# Patient Record
Sex: FEMALE | Race: BLACK OR AFRICAN AMERICAN | NOT HISPANIC OR LATINO | URBAN - METROPOLITAN AREA
[De-identification: names, ages, dates, MRNs, and addresses within clinical notes are randomized per-mention and may not be internally consistent; named-entity substitution may affect disease eponyms.]

---

## 2021-11-03 ENCOUNTER — EMERGENCY (EMERGENCY)
Facility: HOSPITAL | Age: 26
LOS: 1 days | Discharge: HOME | End: 2021-11-03
Attending: EMERGENCY MEDICINE | Admitting: EMERGENCY MEDICINE
Payer: MEDICAID

## 2021-11-03 VITALS
OXYGEN SATURATION: 98 % | RESPIRATION RATE: 18 BRPM | DIASTOLIC BLOOD PRESSURE: 65 MMHG | HEART RATE: 70 BPM | TEMPERATURE: 98 F | SYSTOLIC BLOOD PRESSURE: 102 MMHG

## 2021-11-03 VITALS
WEIGHT: 117.07 LBS | HEART RATE: 105 BPM | DIASTOLIC BLOOD PRESSURE: 76 MMHG | RESPIRATION RATE: 16 BRPM | TEMPERATURE: 98 F | HEIGHT: 64 IN | OXYGEN SATURATION: 99 % | SYSTOLIC BLOOD PRESSURE: 108 MMHG

## 2021-11-03 DIAGNOSIS — R07.89 OTHER CHEST PAIN: ICD-10-CM

## 2021-11-03 DIAGNOSIS — I25.10 ATHEROSCLEROTIC HEART DISEASE OF NATIVE CORONARY ARTERY WITHOUT ANGINA PECTORIS: ICD-10-CM

## 2021-11-03 DIAGNOSIS — Z88.1 ALLERGY STATUS TO OTHER ANTIBIOTIC AGENTS STATUS: ICD-10-CM

## 2021-11-03 DIAGNOSIS — M71.21 SYNOVIAL CYST OF POPLITEAL SPACE [BAKER], RIGHT KNEE: ICD-10-CM

## 2021-11-03 DIAGNOSIS — R00.2 PALPITATIONS: ICD-10-CM

## 2021-11-03 LAB
ALBUMIN SERPL ELPH-MCNC: 4.2 G/DL — SIGNIFICANT CHANGE UP (ref 3.4–5)
ALP SERPL-CCNC: 80 U/L — SIGNIFICANT CHANGE UP (ref 40–120)
ALT FLD-CCNC: 37 U/L — SIGNIFICANT CHANGE UP (ref 12–42)
ANION GAP SERPL CALC-SCNC: 10 MMOL/L — SIGNIFICANT CHANGE UP (ref 9–16)
APTT BLD: 32.7 SEC — SIGNIFICANT CHANGE UP (ref 27.5–35.5)
AST SERPL-CCNC: 19 U/L — SIGNIFICANT CHANGE UP (ref 15–37)
BASOPHILS # BLD AUTO: 0.05 K/UL — SIGNIFICANT CHANGE UP (ref 0–0.2)
BASOPHILS NFR BLD AUTO: 0.8 % — SIGNIFICANT CHANGE UP (ref 0–2)
BILIRUB SERPL-MCNC: 0.2 MG/DL — SIGNIFICANT CHANGE UP (ref 0.2–1.2)
BUN SERPL-MCNC: 11 MG/DL — SIGNIFICANT CHANGE UP (ref 7–23)
CALCIUM SERPL-MCNC: 9.7 MG/DL — SIGNIFICANT CHANGE UP (ref 8.5–10.5)
CHLORIDE SERPL-SCNC: 103 MMOL/L — SIGNIFICANT CHANGE UP (ref 96–108)
CO2 SERPL-SCNC: 28 MMOL/L — SIGNIFICANT CHANGE UP (ref 22–31)
CREAT SERPL-MCNC: 1.02 MG/DL — SIGNIFICANT CHANGE UP (ref 0.5–1.3)
D DIMER BLD IA.RAPID-MCNC: <187 NG/ML DDU — SIGNIFICANT CHANGE UP
EOSINOPHIL # BLD AUTO: 0.45 K/UL — SIGNIFICANT CHANGE UP (ref 0–0.5)
EOSINOPHIL NFR BLD AUTO: 7.2 % — HIGH (ref 0–6)
GLUCOSE SERPL-MCNC: 92 MG/DL — SIGNIFICANT CHANGE UP (ref 70–99)
HCT VFR BLD CALC: 34.5 % — SIGNIFICANT CHANGE UP (ref 34.5–45)
HGB BLD-MCNC: 11.6 G/DL — SIGNIFICANT CHANGE UP (ref 11.5–15.5)
IMM GRANULOCYTES NFR BLD AUTO: 0.2 % — SIGNIFICANT CHANGE UP (ref 0–1.5)
INR BLD: 1.05 — SIGNIFICANT CHANGE UP (ref 0.88–1.16)
LYMPHOCYTES # BLD AUTO: 2.47 K/UL — SIGNIFICANT CHANGE UP (ref 1–3.3)
LYMPHOCYTES # BLD AUTO: 39.8 % — SIGNIFICANT CHANGE UP (ref 13–44)
MANUAL SMEAR VERIFICATION: SIGNIFICANT CHANGE UP
MCHC RBC-ENTMCNC: 24.4 PG — LOW (ref 27–34)
MCHC RBC-ENTMCNC: 33.6 GM/DL — SIGNIFICANT CHANGE UP (ref 32–36)
MCV RBC AUTO: 72.6 FL — LOW (ref 80–100)
MONOCYTES # BLD AUTO: 0.52 K/UL — SIGNIFICANT CHANGE UP (ref 0–0.9)
MONOCYTES NFR BLD AUTO: 8.4 % — SIGNIFICANT CHANGE UP (ref 2–14)
NEUTROPHILS # BLD AUTO: 2.71 K/UL — SIGNIFICANT CHANGE UP (ref 1.8–7.4)
NEUTROPHILS NFR BLD AUTO: 43.6 % — SIGNIFICANT CHANGE UP (ref 43–77)
NRBC # BLD: 0 /100 WBCS — SIGNIFICANT CHANGE UP (ref 0–0)
PLAT MORPH BLD: NORMAL — SIGNIFICANT CHANGE UP
PLATELET # BLD AUTO: 372 K/UL — SIGNIFICANT CHANGE UP (ref 150–400)
POTASSIUM SERPL-MCNC: 4 MMOL/L — SIGNIFICANT CHANGE UP (ref 3.5–5.3)
POTASSIUM SERPL-SCNC: 4 MMOL/L — SIGNIFICANT CHANGE UP (ref 3.5–5.3)
PROT SERPL-MCNC: 7.9 G/DL — SIGNIFICANT CHANGE UP (ref 6.4–8.2)
PROTHROM AB SERPL-ACNC: 12.4 SEC — SIGNIFICANT CHANGE UP (ref 10.6–13.6)
RBC # BLD: 4.75 M/UL — SIGNIFICANT CHANGE UP (ref 3.8–5.2)
RBC # FLD: 16 % — HIGH (ref 10.3–14.5)
RBC BLD AUTO: NORMAL — SIGNIFICANT CHANGE UP
SODIUM SERPL-SCNC: 141 MMOL/L — SIGNIFICANT CHANGE UP (ref 132–145)
WBC # BLD: 6.21 K/UL — SIGNIFICANT CHANGE UP (ref 3.8–10.5)
WBC # FLD AUTO: 6.21 K/UL — SIGNIFICANT CHANGE UP (ref 3.8–10.5)

## 2021-11-03 NOTE — ED ADULT NURSE REASSESSMENT NOTE - NS ED NURSE REASSESS COMMENT FT1
Pt received from day shift A&Ox3, spon resps on Ra unlabored, NAD. Pt just returned from US. Pt reports mild headache, but refused medication. Pending blood draw. Will complete and continue to monitor.

## 2021-11-03 NOTE — ED PROVIDER NOTE - PATIENT PORTAL LINK FT
You can access the FollowMyHealth Patient Portal offered by [facility] by registering at the following website: [email or web address]. By joining [employer]’s FollowMyHealth portal, you will also be able to view your health information using other applications (apps) compatible with our system.

## 2021-11-03 NOTE — ED PROVIDER NOTE - OBJECTIVE STATEMENT
25 y.o. female with c/o cest pain palpitations and R calf pain, she was evaluated at her PCP and a ddimer was (+), she was sent to the ER at [facility] and they did blood tests and sent her home. She had another ddimer at her PCP and oit was still elevated, so she came to [facility]. c/o R calf pain, chest pain resolved, no exertional sx. no FH DVT. (+) FHX CAD, sickle cell trait.

## 2021-11-03 NOTE — ED ADULT TRIAGE NOTE - CHIEF COMPLAINT QUOTE
Sent from [facility] (also works there) for CP w/ palpitations and right calf pain,  intermittent since August. Pt states +D-Dimer in office today,  sent for r/o PE & DVT. + pmhx : sickle sell. LMP

## 2021-11-03 NOTE — ED PROVIDER NOTE - CLINICAL SUMMARY MEDICAL DECISION MAKING FREE TEXT BOX
labs EKG OK.  and US (+) for bakers cyst, negative for DVT. Stable for dc home. has PCP outpatient followup.

## 2022-02-10 NOTE — ED PROVIDER NOTE - INTERPRETATION
Prior Authorization Retail Medication Request    Medication/Dose: linaclotide (LINZESS) 145 MCG capsule  ICD code (if different than what is on RX):    Previously Tried and Failed:  Has tried and failed lactulose solution and Trulance  Rationale:      Insurance Name:    Insurance ID:        Pharmacy Information (if different than what is on RX)  Name:    Phone:     abnormal

## 2024-06-27 ENCOUNTER — EMERGENCY (EMERGENCY)
Facility: HOSPITAL | Age: 29
LOS: 1 days | Discharge: HOME | End: 2024-06-27
Attending: EMERGENCY MEDICINE
Payer: COMMERCIAL

## 2024-06-27 VITALS
DIASTOLIC BLOOD PRESSURE: 75 MMHG | SYSTOLIC BLOOD PRESSURE: 120 MMHG | HEIGHT: 64 IN | RESPIRATION RATE: 18 BRPM | OXYGEN SATURATION: 96 % | HEART RATE: 86 BPM | WEIGHT: 124.12 LBS | TEMPERATURE: 99 F

## 2024-06-27 LAB
ALBUMIN SERPL ELPH-MCNC: 4.7 G/DL — SIGNIFICANT CHANGE UP (ref 3.3–5.2)
ALP SERPL-CCNC: 72 U/L — SIGNIFICANT CHANGE UP (ref 40–120)
ALT FLD-CCNC: 29 U/L — SIGNIFICANT CHANGE UP
ANION GAP SERPL CALC-SCNC: 14 MMOL/L — SIGNIFICANT CHANGE UP (ref 5–17)
ANISOCYTOSIS BLD QL: SLIGHT — SIGNIFICANT CHANGE UP
APPEARANCE UR: CLEAR — SIGNIFICANT CHANGE UP
AST SERPL-CCNC: 26 U/L — SIGNIFICANT CHANGE UP
BASOPHILS # BLD AUTO: 0.05 K/UL — SIGNIFICANT CHANGE UP (ref 0–0.2)
BASOPHILS NFR BLD AUTO: 0.5 % — SIGNIFICANT CHANGE UP (ref 0–2)
BILIRUB SERPL-MCNC: <0.2 MG/DL — LOW (ref 0.4–2)
BILIRUB UR-MCNC: NEGATIVE — SIGNIFICANT CHANGE UP
BLD GP AB SCN SERPL QL: SIGNIFICANT CHANGE UP
BUN SERPL-MCNC: 10.3 MG/DL — SIGNIFICANT CHANGE UP (ref 8–20)
CALCIUM SERPL-MCNC: 10 MG/DL — SIGNIFICANT CHANGE UP (ref 8.4–10.5)
CHLORIDE SERPL-SCNC: 99 MMOL/L — SIGNIFICANT CHANGE UP (ref 96–108)
CO2 SERPL-SCNC: 21 MMOL/L — LOW (ref 22–29)
COLOR SPEC: YELLOW — SIGNIFICANT CHANGE UP
CREAT SERPL-MCNC: 0.72 MG/DL — SIGNIFICANT CHANGE UP (ref 0.5–1.3)
DIFF PNL FLD: NEGATIVE — SIGNIFICANT CHANGE UP
EGFR: 118 ML/MIN/1.73M2 — SIGNIFICANT CHANGE UP
EOSINOPHIL # BLD AUTO: 0.39 K/UL — SIGNIFICANT CHANGE UP (ref 0–0.5)
EOSINOPHIL NFR BLD AUTO: 4.1 % — SIGNIFICANT CHANGE UP (ref 0–6)
GLUCOSE SERPL-MCNC: 84 MG/DL — SIGNIFICANT CHANGE UP (ref 70–99)
GLUCOSE UR QL: NEGATIVE MG/DL — SIGNIFICANT CHANGE UP
HCG SERPL-ACNC: HIGH MIU/ML
HCT VFR BLD CALC: 37.4 % — SIGNIFICANT CHANGE UP (ref 34.5–45)
HGB BLD-MCNC: 12.9 G/DL — SIGNIFICANT CHANGE UP (ref 11.5–15.5)
HIV 1 & 2 AB SERPL IA.RAPID: SIGNIFICANT CHANGE UP
IMM GRANULOCYTES NFR BLD AUTO: 0.3 % — SIGNIFICANT CHANGE UP (ref 0–0.9)
KETONES UR-MCNC: NEGATIVE MG/DL — SIGNIFICANT CHANGE UP
LEUKOCYTE ESTERASE UR-ACNC: NEGATIVE — SIGNIFICANT CHANGE UP
LYMPHOCYTES # BLD AUTO: 2.38 K/UL — SIGNIFICANT CHANGE UP (ref 1–3.3)
LYMPHOCYTES # BLD AUTO: 24.7 % — SIGNIFICANT CHANGE UP (ref 13–44)
MANUAL SMEAR VERIFICATION: SIGNIFICANT CHANGE UP
MCHC RBC-ENTMCNC: 25.1 PG — LOW (ref 27–34)
MCHC RBC-ENTMCNC: 34.5 GM/DL — SIGNIFICANT CHANGE UP (ref 32–36)
MCV RBC AUTO: 72.8 FL — LOW (ref 80–100)
MONOCYTES # BLD AUTO: 0.8 K/UL — SIGNIFICANT CHANGE UP (ref 0–0.9)
MONOCYTES NFR BLD AUTO: 8.3 % — SIGNIFICANT CHANGE UP (ref 2–14)
NEUTROPHILS # BLD AUTO: 5.97 K/UL — SIGNIFICANT CHANGE UP (ref 1.8–7.4)
NEUTROPHILS NFR BLD AUTO: 62.1 % — SIGNIFICANT CHANGE UP (ref 43–77)
NITRITE UR-MCNC: NEGATIVE — SIGNIFICANT CHANGE UP
PH UR: 6.5 — SIGNIFICANT CHANGE UP (ref 5–8)
PLAT MORPH BLD: NORMAL — SIGNIFICANT CHANGE UP
PLATELET # BLD AUTO: 361 K/UL — SIGNIFICANT CHANGE UP (ref 150–400)
POLYCHROMASIA BLD QL SMEAR: SLIGHT — SIGNIFICANT CHANGE UP
POTASSIUM SERPL-MCNC: 4.1 MMOL/L — SIGNIFICANT CHANGE UP (ref 3.5–5.3)
POTASSIUM SERPL-SCNC: 4.1 MMOL/L — SIGNIFICANT CHANGE UP (ref 3.5–5.3)
PROT SERPL-MCNC: 7.7 G/DL — SIGNIFICANT CHANGE UP (ref 6.6–8.7)
PROT UR-MCNC: NEGATIVE MG/DL — SIGNIFICANT CHANGE UP
RBC # BLD: 5.14 M/UL — SIGNIFICANT CHANGE UP (ref 3.8–5.2)
RBC # FLD: 16.1 % — HIGH (ref 10.3–14.5)
RBC BLD AUTO: ABNORMAL
SODIUM SERPL-SCNC: 134 MMOL/L — LOW (ref 135–145)
SP GR SPEC: 1.01 — SIGNIFICANT CHANGE UP (ref 1–1.03)
UROBILINOGEN FLD QL: 0.2 MG/DL — SIGNIFICANT CHANGE UP (ref 0.2–1)
WBC # BLD: 9.62 K/UL — SIGNIFICANT CHANGE UP (ref 3.8–10.5)
WBC # FLD AUTO: 9.62 K/UL — SIGNIFICANT CHANGE UP (ref 3.8–10.5)

## 2024-06-27 RX ORDER — SODIUM CHLORIDE 0.9 % (FLUSH) 0.9 %
1000 SYRINGE (ML) INJECTION ONCE
Refills: 0 | Status: COMPLETED | OUTPATIENT
Start: 2024-06-27 | End: 2024-06-27

## 2024-06-27 RX ADMIN — Medication 1000 MILLILITER(S): at 18:14

## 2024-06-28 LAB
C TRACH RRNA SPEC QL NAA+PROBE: SIGNIFICANT CHANGE UP
HCV AB S/CO SERPL IA: 0.11 S/CO — SIGNIFICANT CHANGE UP (ref 0–0.99)
HCV AB SERPL-IMP: SIGNIFICANT CHANGE UP
N GONORRHOEA RRNA SPEC QL NAA+PROBE: SIGNIFICANT CHANGE UP
SPECIMEN SOURCE: SIGNIFICANT CHANGE UP

## 2024-06-29 LAB
CULTURE RESULTS: SIGNIFICANT CHANGE UP
SPECIMEN SOURCE: SIGNIFICANT CHANGE UP

## 2024-07-17 PROBLEM — Z78.9 OTHER SPECIFIED HEALTH STATUS: Chronic | Status: ACTIVE | Noted: 2021-11-03

## 2024-08-08 ENCOUNTER — EMERGENCY (EMERGENCY)
Facility: HOSPITAL | Age: 29
LOS: 1 days | Discharge: HOME | End: 2024-08-08
Attending: EMERGENCY MEDICINE
Payer: COMMERCIAL

## 2024-08-08 VITALS
WEIGHT: 108.69 LBS | HEIGHT: 64 IN | OXYGEN SATURATION: 98 % | TEMPERATURE: 99 F | HEART RATE: 102 BPM | SYSTOLIC BLOOD PRESSURE: 114 MMHG | RESPIRATION RATE: 18 BRPM | DIASTOLIC BLOOD PRESSURE: 86 MMHG

## 2024-08-08 LAB
ALBUMIN SERPL ELPH-MCNC: 4.1 G/DL — SIGNIFICANT CHANGE UP (ref 3.3–5.2)
ALP SERPL-CCNC: 46 U/L — SIGNIFICANT CHANGE UP (ref 40–120)
ALT FLD-CCNC: 8 U/L — SIGNIFICANT CHANGE UP
ANION GAP SERPL CALC-SCNC: 18 MMOL/L — HIGH (ref 5–17)
ANISOCYTOSIS BLD QL: SLIGHT — SIGNIFICANT CHANGE UP
APPEARANCE UR: ABNORMAL
AST SERPL-CCNC: 15 U/L — SIGNIFICANT CHANGE UP
BACTERIA # UR AUTO: ABNORMAL /HPF
BASOPHILS # BLD AUTO: 0.04 K/UL — SIGNIFICANT CHANGE UP (ref 0–0.2)
BASOPHILS NFR BLD AUTO: 0.4 % — SIGNIFICANT CHANGE UP (ref 0–2)
BILIRUB SERPL-MCNC: 0.4 MG/DL — SIGNIFICANT CHANGE UP (ref 0.4–2)
BILIRUB UR-MCNC: NEGATIVE — SIGNIFICANT CHANGE UP
BUN SERPL-MCNC: 5.7 MG/DL — LOW (ref 8–20)
CALCIUM SERPL-MCNC: 9.8 MG/DL — SIGNIFICANT CHANGE UP (ref 8.4–10.5)
CAST: 2 /LPF — SIGNIFICANT CHANGE UP (ref 0–4)
CHLORIDE SERPL-SCNC: 97 MMOL/L — SIGNIFICANT CHANGE UP (ref 96–108)
CO2 SERPL-SCNC: 19 MMOL/L — LOW (ref 22–29)
COLOR SPEC: YELLOW — SIGNIFICANT CHANGE UP
CREAT SERPL-MCNC: 0.5 MG/DL — SIGNIFICANT CHANGE UP (ref 0.5–1.3)
DIFF PNL FLD: NEGATIVE — SIGNIFICANT CHANGE UP
EGFR: 133 ML/MIN/1.73M2 — SIGNIFICANT CHANGE UP
EOSINOPHIL # BLD AUTO: 0.09 K/UL — SIGNIFICANT CHANGE UP (ref 0–0.5)
EOSINOPHIL NFR BLD AUTO: 0.8 % — SIGNIFICANT CHANGE UP (ref 0–6)
GLUCOSE SERPL-MCNC: 69 MG/DL — LOW (ref 70–99)
GLUCOSE UR QL: NEGATIVE MG/DL — SIGNIFICANT CHANGE UP
HCT VFR BLD CALC: 36.7 % — SIGNIFICANT CHANGE UP (ref 34.5–45)
HGB BLD-MCNC: 12.8 G/DL — SIGNIFICANT CHANGE UP (ref 11.5–15.5)
IMM GRANULOCYTES NFR BLD AUTO: 0.5 % — SIGNIFICANT CHANGE UP (ref 0–0.9)
KETONES UR-MCNC: >=160 MG/DL
LEUKOCYTE ESTERASE UR-ACNC: NEGATIVE — SIGNIFICANT CHANGE UP
LYMPHOCYTES # BLD AUTO: 1.26 K/UL — SIGNIFICANT CHANGE UP (ref 1–3.3)
LYMPHOCYTES # BLD AUTO: 11.7 % — LOW (ref 13–44)
MACROCYTES BLD QL: SLIGHT — SIGNIFICANT CHANGE UP
MAGNESIUM SERPL-MCNC: 1.9 MG/DL — SIGNIFICANT CHANGE UP (ref 1.6–2.6)
MANUAL SMEAR VERIFICATION: SIGNIFICANT CHANGE UP
MCHC RBC-ENTMCNC: 25.9 PG — LOW (ref 27–34)
MCHC RBC-ENTMCNC: 34.9 GM/DL — SIGNIFICANT CHANGE UP (ref 32–36)
MCV RBC AUTO: 74.1 FL — LOW (ref 80–100)
MICROCYTES BLD QL: SLIGHT — SIGNIFICANT CHANGE UP
MONOCYTES # BLD AUTO: 0.75 K/UL — SIGNIFICANT CHANGE UP (ref 0–0.9)
MONOCYTES NFR BLD AUTO: 7 % — SIGNIFICANT CHANGE UP (ref 2–14)
NEUTROPHILS # BLD AUTO: 8.57 K/UL — HIGH (ref 1.8–7.4)
NEUTROPHILS NFR BLD AUTO: 79.6 % — HIGH (ref 43–77)
NITRITE UR-MCNC: NEGATIVE — SIGNIFICANT CHANGE UP
PH UR: 6 — SIGNIFICANT CHANGE UP (ref 5–8)
PHOSPHATE SERPL-MCNC: 3.6 MG/DL — SIGNIFICANT CHANGE UP (ref 2.4–4.7)
PLAT MORPH BLD: NORMAL — SIGNIFICANT CHANGE UP
PLATELET # BLD AUTO: 305 K/UL — SIGNIFICANT CHANGE UP (ref 150–400)
POLYCHROMASIA BLD QL SMEAR: SLIGHT — SIGNIFICANT CHANGE UP
POTASSIUM SERPL-MCNC: 3.7 MMOL/L — SIGNIFICANT CHANGE UP (ref 3.5–5.3)
POTASSIUM SERPL-SCNC: 3.7 MMOL/L — SIGNIFICANT CHANGE UP (ref 3.5–5.3)
PROT SERPL-MCNC: 8 G/DL — SIGNIFICANT CHANGE UP (ref 6.6–8.7)
PROT UR-MCNC: 30 MG/DL
RBC # BLD: 4.95 M/UL — SIGNIFICANT CHANGE UP (ref 3.8–5.2)
RBC # FLD: 14.9 % — HIGH (ref 10.3–14.5)
RBC BLD AUTO: ABNORMAL
RBC CASTS # UR COMP ASSIST: 2 /HPF — SIGNIFICANT CHANGE UP (ref 0–4)
SODIUM SERPL-SCNC: 134 MMOL/L — LOW (ref 135–145)
SP GR SPEC: 1.02 — SIGNIFICANT CHANGE UP (ref 1–1.03)
SQUAMOUS # UR AUTO: 25 /HPF — HIGH (ref 0–5)
UROBILINOGEN FLD QL: 1 MG/DL — SIGNIFICANT CHANGE UP (ref 0.2–1)
WBC # BLD: 10.76 K/UL — HIGH (ref 3.8–10.5)
WBC # FLD AUTO: 10.76 K/UL — HIGH (ref 3.8–10.5)
WBC UR QL: 10 /HPF — HIGH (ref 0–5)

## 2024-08-08 RX ORDER — BACTERIOSTATIC SODIUM CHLORIDE 0.9 %
2000 VIAL (ML) INJECTION ONCE
Refills: 0 | Status: COMPLETED | OUTPATIENT
Start: 2024-08-08 | End: 2024-08-08

## 2024-08-08 RX ORDER — METOCLOPRAMIDE HCL 5 MG/ML
10 VIAL (ML) INJECTION ONCE
Refills: 0 | Status: COMPLETED | OUTPATIENT
Start: 2024-08-08 | End: 2024-08-08

## 2024-08-08 RX ADMIN — Medication 10 MILLIGRAM(S): at 14:20

## 2024-08-08 RX ADMIN — Medication 2000 MILLILITER(S): at 14:19

## 2024-08-08 NOTE — ED PROVIDER NOTE - ATTENDING APP SHARED VISIT CONTRIBUTION OF CARE
I, Sary Alcantara, performed a face to face bedside interview with this patient regarding history of present illness, review of symptoms and relevant past medical, social and family history.  I completed an independent physical examination. Medical decision making, follow-up on ordered tests (ie labs, radiologic studies) and re-evaluation of the patient's status has been communicated to the ACP.  Disposition of the patient will be based on test outcome and response to ED interventions.

## 2024-08-08 NOTE — ED PROVIDER NOTE - PROGRESS NOTE DETAILS
reviewed lab work pt tolerating po and reports improvement of symptoms, supportive care and hydration   pt to follow up with pmd outpatient

## 2024-08-08 NOTE — ED PROVIDER NOTE - PATIENT PORTAL LINK FT
You can access the FollowMyHealth Patient Portal offered by [facility] by registering at the following website: [email or web address]. By joining [employer]’s FollowMyHealth portal, you will also be able to view your health information using other applications (apps) compatible with our system.

## 2024-08-08 NOTE — ED PROVIDER NOTE - CLINICAL SUMMARY MEDICAL DECISION MAKING FREE TEXT BOX
patient presenting with nausea vomiting and inability tolerate p.o., currently 11 weeks pregnant, will give IV fluids, check labs, EKG, reassess. patient presenting with nausea vomiting and inability tolerate p.o., currently 11 weeks pregnant, will give IV fluids, check labs, EKG, reassess. - reviewed lab work pt reports improvement of symptoms supportive care and hydration

## 2024-08-08 NOTE — ED PROVIDER NOTE - OBJECTIVE STATEMENT
28-year-old female with no past medical history, G1, P0 at approximately 11 weeks gestation presenting with intractable nausea vomiting, associated with palpitations, dizziness and weakness.  Patient's OB/GYN is in [place].  She states that she has been unable to tolerate anything by mouth.  Tried diplegia's, Zofran without any relief.  Denies any diarrhea, endorsing abdominal cramping, no vaginal bleeding.  She has had an ultrasound during this pregnancy which demonstrated an intrauterine pregnancy. 28-year-old female with no past medical history, G1, P0 at approximately 11 weeks gestation presenting with intractable nausea vomiting, associated with palpitations, dizziness and weakness.  Patient's OB/GYN is in [place].  She states that she has been unable to tolerate anything by mouth.  Tried Diclegis, Zofran without any relief.  Denies any diarrhea, endorsing abdominal cramping, no vaginal bleeding.  She has had an ultrasound during this pregnancy which demonstrated an intrauterine pregnancy.

## 2024-08-09 ENCOUNTER — EMERGENCY (EMERGENCY)
Facility: HOSPITAL | Age: 29
LOS: 1 days | Discharge: HOME | End: 2024-08-09
Attending: STUDENT IN AN ORGANIZED HEALTH CARE EDUCATION/TRAINING PROGRAM
Payer: COMMERCIAL

## 2024-08-09 VITALS
WEIGHT: 119.93 LBS | OXYGEN SATURATION: 100 % | TEMPERATURE: 98 F | HEART RATE: 84 BPM | RESPIRATION RATE: 18 BRPM | DIASTOLIC BLOOD PRESSURE: 65 MMHG | HEIGHT: 64 IN | SYSTOLIC BLOOD PRESSURE: 110 MMHG

## 2024-08-09 VITALS
DIASTOLIC BLOOD PRESSURE: 70 MMHG | TEMPERATURE: 98 F | SYSTOLIC BLOOD PRESSURE: 112 MMHG | HEART RATE: 80 BPM | RESPIRATION RATE: 18 BRPM | OXYGEN SATURATION: 99 %

## 2024-08-09 LAB
ALBUMIN SERPL ELPH-MCNC: 4.3 G/DL — SIGNIFICANT CHANGE UP (ref 3.3–5.2)
ALP SERPL-CCNC: 44 U/L — SIGNIFICANT CHANGE UP (ref 40–120)
ALT FLD-CCNC: 16 U/L — SIGNIFICANT CHANGE UP
ANION GAP SERPL CALC-SCNC: 25 MMOL/L — HIGH (ref 5–17)
AST SERPL-CCNC: 28 U/L — SIGNIFICANT CHANGE UP
BASOPHILS # BLD AUTO: 0.02 K/UL — SIGNIFICANT CHANGE UP (ref 0–0.2)
BASOPHILS NFR BLD AUTO: 0.2 % — SIGNIFICANT CHANGE UP (ref 0–2)
BILIRUB SERPL-MCNC: 0.6 MG/DL — SIGNIFICANT CHANGE UP (ref 0.4–2)
BLD GP AB SCN SERPL QL: SIGNIFICANT CHANGE UP
BUN SERPL-MCNC: 4.1 MG/DL — LOW (ref 8–20)
CALCIUM SERPL-MCNC: 10 MG/DL — SIGNIFICANT CHANGE UP (ref 8.4–10.5)
CHLORIDE SERPL-SCNC: 96 MMOL/L — SIGNIFICANT CHANGE UP (ref 96–108)
CO2 SERPL-SCNC: 12 MMOL/L — LOW (ref 22–29)
CREAT SERPL-MCNC: 0.48 MG/DL — LOW (ref 0.5–1.3)
CULTURE RESULTS: SIGNIFICANT CHANGE UP
EGFR: 134 ML/MIN/1.73M2 — SIGNIFICANT CHANGE UP
EOSINOPHIL # BLD AUTO: 0.01 K/UL — SIGNIFICANT CHANGE UP (ref 0–0.5)
EOSINOPHIL NFR BLD AUTO: 0.1 % — SIGNIFICANT CHANGE UP (ref 0–6)
GAS PNL BLDV: SIGNIFICANT CHANGE UP
GLUCOSE SERPL-MCNC: 92 MG/DL — SIGNIFICANT CHANGE UP (ref 70–99)
HCG SERPL-ACNC: HIGH MIU/ML
HCT VFR BLD CALC: 34.7 % — SIGNIFICANT CHANGE UP (ref 34.5–45)
HGB BLD-MCNC: 12.3 G/DL — SIGNIFICANT CHANGE UP (ref 11.5–15.5)
IMM GRANULOCYTES NFR BLD AUTO: 0.5 % — SIGNIFICANT CHANGE UP (ref 0–0.9)
LIDOCAIN IGE QN: 28 U/L — SIGNIFICANT CHANGE UP (ref 22–51)
LYMPHOCYTES # BLD AUTO: 1.02 K/UL — SIGNIFICANT CHANGE UP (ref 1–3.3)
LYMPHOCYTES # BLD AUTO: 8.5 % — LOW (ref 13–44)
MCHC RBC-ENTMCNC: 25.8 PG — LOW (ref 27–34)
MCHC RBC-ENTMCNC: 35.4 GM/DL — SIGNIFICANT CHANGE UP (ref 32–36)
MCV RBC AUTO: 72.9 FL — LOW (ref 80–100)
MONOCYTES # BLD AUTO: 0.46 K/UL — SIGNIFICANT CHANGE UP (ref 0–0.9)
MONOCYTES NFR BLD AUTO: 3.8 % — SIGNIFICANT CHANGE UP (ref 2–14)
NEUTROPHILS # BLD AUTO: 10.49 K/UL — HIGH (ref 1.8–7.4)
NEUTROPHILS NFR BLD AUTO: 86.9 % — HIGH (ref 43–77)
PLATELET # BLD AUTO: 247 K/UL — SIGNIFICANT CHANGE UP (ref 150–400)
POTASSIUM SERPL-MCNC: 3.7 MMOL/L — SIGNIFICANT CHANGE UP (ref 3.5–5.3)
POTASSIUM SERPL-SCNC: 3.7 MMOL/L — SIGNIFICANT CHANGE UP (ref 3.5–5.3)
PROT SERPL-MCNC: 8.1 G/DL — SIGNIFICANT CHANGE UP (ref 6.6–8.7)
RBC # BLD: 4.76 M/UL — SIGNIFICANT CHANGE UP (ref 3.8–5.2)
RBC # FLD: 14.9 % — HIGH (ref 10.3–14.5)
SODIUM SERPL-SCNC: 133 MMOL/L — LOW (ref 135–145)
SPECIMEN SOURCE: SIGNIFICANT CHANGE UP
WBC # BLD: 12.06 K/UL — HIGH (ref 3.8–10.5)
WBC # FLD AUTO: 12.06 K/UL — HIGH (ref 3.8–10.5)

## 2024-08-09 RX ORDER — BACTERIOSTATIC SODIUM CHLORIDE 0.9 %
1000 VIAL (ML) INJECTION ONCE
Refills: 0 | Status: COMPLETED | OUTPATIENT
Start: 2024-08-09 | End: 2024-08-09

## 2024-08-09 RX ORDER — PANTOPRAZOLE SODIUM 20 MG/1
20 TABLET, DELAYED RELEASE ORAL ONCE
Refills: 0 | Status: COMPLETED | OUTPATIENT
Start: 2024-08-09 | End: 2024-08-09

## 2024-08-09 RX ORDER — DIPHENHYDRAMINE HCL 25 MG
25 CAPSULE ORAL ONCE
Refills: 0 | Status: COMPLETED | OUTPATIENT
Start: 2024-08-09 | End: 2024-08-09

## 2024-08-09 RX ORDER — ACETAMINOPHEN 500 MG
1000 TABLET ORAL ONCE
Refills: 0 | Status: COMPLETED | OUTPATIENT
Start: 2024-08-09 | End: 2024-08-09

## 2024-08-09 RX ORDER — MAGNESIUM, ALUMINUM HYDROXIDE 200-225/5
30 SUSPENSION, ORAL (FINAL DOSE FORM) ORAL ONCE
Refills: 0 | Status: COMPLETED | OUTPATIENT
Start: 2024-08-09 | End: 2024-08-09

## 2024-08-09 RX ORDER — DEXTROSE MONOHYDRATE, SODIUM CHLORIDE, SODIUM LACTATE, CALCIUM CHLORIDE, MAGNESIUM CHLORIDE 1.5; 538; 448; 18.4; 5.08 G/100ML; MG/100ML; MG/100ML; MG/100ML; MG/100ML
1000 SOLUTION INTRAPERITONEAL
Refills: 0 | Status: ACTIVE | OUTPATIENT
Start: 2024-08-09 | End: 2025-07-08

## 2024-08-09 RX ORDER — METOCLOPRAMIDE HCL 5 MG/ML
10 VIAL (ML) INJECTION ONCE
Refills: 0 | Status: COMPLETED | OUTPATIENT
Start: 2024-08-09 | End: 2024-08-09

## 2024-08-09 RX ORDER — DEXTROSE MONOHYDRATE, SODIUM CHLORIDE, SODIUM LACTATE, CALCIUM CHLORIDE, MAGNESIUM CHLORIDE 1.5; 538; 448; 18.4; 5.08 G/100ML; MG/100ML; MG/100ML; MG/100ML; MG/100ML
1000 SOLUTION INTRAPERITONEAL ONCE
Refills: 0 | Status: COMPLETED | OUTPATIENT
Start: 2024-08-09 | End: 2024-08-09

## 2024-08-09 RX ORDER — ONDANSETRON HCL/PF 4 MG/2 ML
4 VIAL (ML) INJECTION ONCE
Refills: 0 | Status: COMPLETED | OUTPATIENT
Start: 2024-08-09 | End: 2024-08-09

## 2024-08-09 RX ORDER — FAMOTIDINE 40 MG/1
20 TABLET, FILM COATED ORAL
Refills: 0 | Status: ACTIVE | OUTPATIENT
Start: 2024-08-09 | End: 2025-07-08

## 2024-08-09 RX ORDER — FAMOTIDINE 40 MG/1
20 TABLET, FILM COATED ORAL ONCE
Refills: 0 | Status: COMPLETED | OUTPATIENT
Start: 2024-08-09 | End: 2024-08-09

## 2024-08-09 RX ORDER — METOCLOPRAMIDE HCL 5 MG/ML
10 VIAL (ML) INJECTION EVERY 6 HOURS
Refills: 0 | Status: ACTIVE | OUTPATIENT
Start: 2024-08-09 | End: 2025-07-08

## 2024-08-09 RX ORDER — DIPHENHYDRAMINE HCL 25 MG
25 CAPSULE ORAL EVERY 6 HOURS
Refills: 0 | Status: ACTIVE | OUTPATIENT
Start: 2024-08-09 | End: 2025-07-08

## 2024-08-09 RX ORDER — ACETAMINOPHEN 500 MG
975 TABLET ORAL EVERY 6 HOURS
Refills: 0 | Status: DISCONTINUED | OUTPATIENT
Start: 2024-08-09 | End: 2024-08-09

## 2024-08-09 RX ADMIN — PANTOPRAZOLE SODIUM 20 MILLIGRAM(S): 20 TABLET, DELAYED RELEASE ORAL at 17:05

## 2024-08-09 RX ADMIN — Medication 10 MILLIGRAM(S): at 15:10

## 2024-08-09 RX ADMIN — Medication 4 MILLIGRAM(S): at 17:06

## 2024-08-09 RX ADMIN — Medication 10 MILLIGRAM(S): at 17:05

## 2024-08-09 RX ADMIN — Medication 4 MILLIGRAM(S): at 17:55

## 2024-08-09 RX ADMIN — FAMOTIDINE 20 MILLIGRAM(S): 40 TABLET, FILM COATED ORAL at 15:45

## 2024-08-09 RX ADMIN — Medication 1000 MILLIGRAM(S): at 15:33

## 2024-08-09 RX ADMIN — Medication 10 MILLIGRAM(S): at 17:06

## 2024-08-09 RX ADMIN — Medication 1000 MILLILITER(S): at 15:45

## 2024-08-09 RX ADMIN — Medication 4 MILLIGRAM(S): at 20:46

## 2024-08-09 RX ADMIN — DEXTROSE MONOHYDRATE, SODIUM CHLORIDE, SODIUM LACTATE, CALCIUM CHLORIDE, MAGNESIUM CHLORIDE 125 MILLILITER(S): 1.5; 538; 448; 18.4; 5.08 SOLUTION INTRAPERITONEAL at 23:04

## 2024-08-09 RX ADMIN — DEXTROSE MONOHYDRATE, SODIUM CHLORIDE, SODIUM LACTATE, CALCIUM CHLORIDE, MAGNESIUM CHLORIDE 100 MILLILITER(S): 1.5; 538; 448; 18.4; 5.08 SOLUTION INTRAPERITONEAL at 15:52

## 2024-08-09 RX ADMIN — Medication 30 MILLILITER(S): at 18:52

## 2024-08-09 RX ADMIN — DEXTROSE MONOHYDRATE, SODIUM CHLORIDE, SODIUM LACTATE, CALCIUM CHLORIDE, MAGNESIUM CHLORIDE 1000 MILLILITER(S): 1.5; 538; 448; 18.4; 5.08 SOLUTION INTRAPERITONEAL at 23:03

## 2024-08-09 RX ADMIN — Medication 1000 MILLILITER(S): at 15:10

## 2024-08-09 RX ADMIN — Medication 400 MILLIGRAM(S): at 15:10

## 2024-08-09 NOTE — ED ADULT NURSE NOTE - CHIEF COMPLAINT QUOTE
Pt BIBA c/o vomiting, abdominal pain.  Pt is approx 11 weeks pregnant.  Pt was treated yesterday for similar symptoms and told to return if symptoms worsened.
detailed exam

## 2024-08-09 NOTE — ED PROVIDER NOTE - PHYSICAL EXAMINATION
General: Awake, alert, lying in bed in NAD  HEENT: Normocephalic, atraumatic. No scleral icterus or conjunctival injection. EOMI. Moist mucous membranes. Oropharynx clear.   Neck:. Soft and supple.  Cardiac: RRR, Peripheral pulses 2+ and symmetric. No LE edema.  Resp: Lungs CTAB. No accessory muscle use  Abd: TTP LUQ. Otherwise, soft, non-tender, non-distended. No guarding, rebound, or rigidity.  Back: Spine midline and non-tender.   Skin: No rashes, abrasions, or lacerations.  Neuro: AO x 4. Moves all extremities symmetrically. Motor strength and sensation grossly intact.  Psych: Appropriate mood and affect

## 2024-08-09 NOTE — CONSULT NOTE ADULT - ATTENDING COMMENTS
28y  at 11w2d GA by LMP 24 and confirmed with 1TM US who presents to the ER for nausea, vomiting, and epigastric pain with known Hyperemesis Gravidarum, suggest IV fluids and standing Reglan as patient reports palpitations with Zofran. Return precautions discussed.

## 2024-08-09 NOTE — ED PROVIDER NOTE - NSFOLLOWUPINSTRUCTIONS_ED_ALL_ED_FT
- Please follow-up with your primary care doctor in the next 1-2 days.  Please call tomorrow for an appointment.  If you cannot follow-up with your primary care doctor please return to the ED for any urgent issues.  - You were given a copy of the tests performed today.  Please bring the results with you and review them with your primary care doctor.  - If you have any worsening of symptoms or any other concerns please return to the ED immediately.  - Please continue taking your home medications as directed.     Nausea / Vomiting    Nausea is the feeling that you have to vomit. As nausea gets worse, it can lead to vomiting. Vomiting puts you at an increased risk for dehydration. Older adults and people with other diseases or a weak immune system are at higher risk for dehydration. Drink clear fluids in small but frequent amounts as tolerated. Eat bland, easy-to-digest foods in small amounts as tolerated.    SEEK IMMEDIATE MEDICAL CARE IF YOU HAVE ANY OF THE FOLLOWING SYMPTOMS: fever, inability to keep sufficient fluids down, black or bloody vomitus, black or bloody stools, lightheadedness/dizziness, chest pain, severe headache, rash, shortness of breath, cold or clammy skin, confusion, pain with urination, or any signs of dehydration.

## 2024-08-09 NOTE — ED ADULT NURSE REASSESSMENT NOTE - NS ED NURSE REASSESS COMMENT FT1
Pt is resting in stretcher comfortably at this time. NAD. VSS. Respirations even and unlabored. Pt safety maintained. Pt denies any complaints at this time. Plan of care on going.

## 2024-08-09 NOTE — CONSULT NOTE ADULT - ASSESSMENT
27 yo  at 11w GA who presents to the ED for nausea, vomiting, and epigastric pain in the setting of known Hyperemesis Gravidarum. OBGYN consulted.   -Afebrile, VSS  -Electrolytes wnl  -Anion gap present, ketones in urine  -Pending lactate lab  -Pt had not recieved IVF or anti-nausea meds in the ER prior to GYN consulting the patient bedside as the pt's IV had infiltrated  -Recommend IVF LR bolus then maintenance 125ml/hr, IV reglan, pyridoxine doxylamine diphenhydramine pepcid, phenergan standing   -Pending lactate labs    Dispo: continue to observe, see how patient improves with IVF and medications as per above    D/w attending Dr. Jackson 29 yo  at 11w GA who presents to the ED for nausea, vomiting, and epigastric pain in the setting of known Hyperemesis Gravidarum. OBGYN consulted.   -Afebrile, VSS  -Electrolytes wnl  -Anion gap present, ketones in urine, blood glucose wnl  -Pt had not recieved IVF or anti-nausea meds in the ER prior to GYN consulting the patient bedside as the pt's IV had infiltrated  -Recommend IVF LR bolus then maintenance 125ml/hr, IV reglan, pyridoxine doxylamine diphenhydramine pepcid, phenergan standing   -Pending lactate labs    Dispo: continue to observe, see how patient improves with IVF and medications as per above    D/w attending Dr. Jackson 29 yo  at 11w GA who presents to the ED for nausea, vomiting, and epigastric pain in the setting of known Hyperemesis Gravidarum. OBGYN consulted.   -Afebrile, VSS  -Electrolytes wnl  -Anion gap present, ketones in urine, blood glucose wnl  -Pt had not recieved IVF or anti-nausea meds in the ER prior to GYN consulting the patient bedside as the pt's IV had infiltrated  -Recommend IVF LR bolus then maintenance 125ml/hr, IV reglan, pyridoxine doxylamine diphenhydramine pepcid, phenergan standing   -Pending lactate labs    Dispo: continue to observe, see how patient improves with IVF and medications as per above      ---------0122  Pt reevaluated bedside s/p zofran and reglan  reports improvement in symptoms, will try to tolerate PO. Is comfortable with going home with PO meds to pharmacy.  No need for acute inpatient admission or intervention at this time given response to medical therapy.  Stable for outpatient followup    D/w attending Dr. Jackson

## 2024-08-09 NOTE — CONSULT NOTE ADULT - SUBJECTIVE AND OBJECTIVE BOX
Name: DOMINIC MABRY  MRN: [id number]    DOMINIC MABRY is a 28y  at 11w GA who presents to the ER for nausea, vomiting, and epigastric pain with known Hyperemesis Gravidarum. Patient was diagnosed with HG at 6w GA. She was seen in the ER yesterday for similar symptoms. At that time, she recieved IVF, anti-nausea meds, and PO challenge. She passed the PO challenge and was discharged home. Patient denies fevers, chills, or sweats. She denies hematemesis. She denies constipation or diarrhea. She reports tolerating a few sips of water, a few crackers daily. She reports not being able to tolerate a full meal in weeks. Patient is unsure if she has lost weight so far this pregnancy.  Pt is seen for her OBGYN care at a [facility] practice in [place]. She has been prescribed reglan, Unisom at home for her HG symptoms.   At the time of being seen by OBGYN team in the ED, the patient had not yet recieved reglan or IV fluids because her IV line infiltrated. Her IV fluids were just being started while bedside. Pt received IV reglan while bedside.     PAST OBSTETRICAL HISTORY:   PAST GYN HISTORY: Denies history of abormal paps, STDs, ovarian cysts, or uterine fibroids.  PAST MEDICAL HISTORY: denies  PAST SURGICAL HISTORY: denies  HOME MEDS: unisom, reglan  SOCIAL:  Denies tobacco or drug use. Feels safe at home.     HOSPITAL MEDS:  dextrose 5% + sodium chloride 0.9%. 1000 milliLiter(s) IV Continuous <Continuous>      ROS:  CONSTITUTIONAL: No fever, weight loss, or fatigue  BREASTS: No pain, masses, or nipple discharge  RESPIRATORY: No shortness of breath  CARDIOVASCULAR: No chest pain, palpitations, dizziness, or leg swelling  GASTROINTESTINAL: Epigastric pain per HPI. Nausea, vomiting per HPI. No diarrhea or constipation.   GENITOURINARY: No dysuria or incontinence  SKIN: No itching, burning, rashes, or lesions   ENDOCRINE: No heat or cold intolerance; No hair loss  PSYCHIATRIC: No depression, anxiety, mood swings, or difficulty sleeping  HEME/LYMPH: No easy bruising, or bleeding gums    Vital Signs Last 24 Hrs  T(C): 36.7 (09 Aug 2024 17:00), Max: 36.9 (09 Aug 2024 13:45)  T(F): 98 (09 Aug 2024 17:00), Max: 98.4 (09 Aug 2024 13:45)  HR: 78 (09 Aug 2024 17:00) (78 - 84)  BP: 116/72 (09 Aug 2024 17:00) (110/65 - 116/72)  RR: 18 (09 Aug 2024 17:00) (18 - 18)  SpO2: 99% (09 Aug 2024 17:00) (99% - 100%) on RA    PHYSICAL EXAM:  GEN: NAD, AOx3  CV: S1S2, RRR.  Pulm: CTABL, no WRR. Speaking in full sentences without shortness of breath.  Abd: Soft, Nontender, Nondistended. No rebound tenderness or guarding. Bowel sounds present  PELVIC: declined    LABS:                        12.3   12.06 )-----------( 247      ( 09 Aug 2024 14:50 )             34.7     08    133<L>  |  96  |  4.1<L>  ----------------------------<  92  3.7   |  12.0<L>  |  0.48<L>    Ca    10.0      09 Aug 2024 14:50  Phos  3.6     08-  Mg     1.9     08-08    TPro  8.1  /  Alb  4.3  /  TBili  0.6  /  DBili  x   /  AST  28  /  ALT  16  /  AlkPhos  44  0809    Urinalysis Basic - ( 09 Aug 2024 14:50 )    Color: x / Appearance: x / SG: x / pH: x  Gluc: 92 mg/dL / Ketone: x  / Bili: x / Urobili: x   Blood: x / Protein: x / Nitrite: x   Leuk Esterase: x / RBC: x / WBC x   Sq Epi: x / Non Sq Epi: x / Bacteria: x      HCG Quantitative, Serum: 174814.0 mIU/mL (24 @ 14:50)    ABO RH Interpretation: O POS (24 @ 14:50)      RADIOLOGY STUDIES: US pending   Name: DOMINIC MABRY  MRN: [id number]    DOMINIC MABRY is a 28y  at 11w2d GA by LMP 24 and confirmed with 1TM US who presents to the ER for nausea, vomiting, and epigastric pain with known Hyperemesis Gravidarum. Patient was diagnosed with HG at 6w GA. She was seen in the ER yesterday for similar symptoms. At that time, she recieved IVF, anti-nausea meds, and PO challenge. She passed the PO challenge and was discharged home. Patient denies fevers, chills, or sweats. She denies hematemesis. She denies constipation or diarrhea. She reports tolerating a few sips of water, a few crackers daily. She reports not being able to tolerate a full meal in weeks. Patient is unsure if she has lost weight so far this pregnancy.  Pt is seen for her OBGYN care at a [facility] practice in [place]. She has been prescribed reglan, Unisom at home for her HG symptoms.   At the time of being seen by OBGYN team in the ED, the patient had not yet recieved reglan or IV fluids because her IV line infiltrated. Her IV fluids were just being started while bedside. Pt received IV reglan while bedside.     PAST OBSTETRICAL HISTORY:   PAST GYN HISTORY: Denies history of abormal paps, STDs, ovarian cysts, or uterine fibroids.  PAST MEDICAL HISTORY: denies  PAST SURGICAL HISTORY: denies  HOME MEDS: unisom, reglan  SOCIAL:  Denies tobacco or drug use. Feels safe at home.     HOSPITAL MEDS:  dextrose 5% + sodium chloride 0.9%. 1000 milliLiter(s) IV Continuous <Continuous>      ROS:  CONSTITUTIONAL: No fever, weight loss, or fatigue  BREASTS: No pain, masses, or nipple discharge  RESPIRATORY: No shortness of breath  CARDIOVASCULAR: No chest pain, palpitations, dizziness, or leg swelling  GASTROINTESTINAL: Epigastric pain per HPI. Nausea, vomiting per HPI. No diarrhea or constipation.   GENITOURINARY: No dysuria or incontinence  SKIN: No itching, burning, rashes, or lesions   ENDOCRINE: No heat or cold intolerance; No hair loss  PSYCHIATRIC: No depression, anxiety, mood swings, or difficulty sleeping  HEME/LYMPH: No easy bruising, or bleeding gums    Vital Signs Last 24 Hrs  T(C): 36.7 (09 Aug 2024 17:00), Max: 36.9 (09 Aug 2024 13:45)  T(F): 98 (09 Aug 2024 17:00), Max: 98.4 (09 Aug 2024 13:45)  HR: 78 (09 Aug 2024 17:00) (78 - 84)  BP: 116/72 (09 Aug 2024 17:00) (110/65 - 116/72)  RR: 18 (09 Aug 2024 17:00) (18 - 18)  SpO2: 99% (09 Aug 2024 17:00) (99% - 100%) on RA    PHYSICAL EXAM:  GEN: NAD, AOx3  CV: S1S2, RRR.  Pulm: CTABL, no WRR. Speaking in full sentences without shortness of breath.  Abd: Soft, Nontender, Nondistended. No rebound tenderness or guarding. Bowel sounds present  PELVIC: declined    LABS:                        12.3   12.06 )-----------( 247      ( 09 Aug 2024 14:50 )             34.7     08-    133<L>  |  96  |  4.1<L>  ----------------------------<  92  3.7   |  12.0<L>  |  0.48<L>    Ca    10.0      09 Aug 2024 14:50  Phos  3.6     08-08  Mg     1.9     08-08    TPro  8.1  /  Alb  4.3  /  TBili  0.6  /  DBili  x   /  AST  28  /  ALT  16  /  AlkPhos  44  08-09    Urinalysis Basic - ( 09 Aug 2024 14:50 )    Color: x / Appearance: x / SG: x / pH: x  Gluc: 92 mg/dL / Ketone: x  / Bili: x / Urobili: x   Blood: x / Protein: x / Nitrite: x   Leuk Esterase: x / RBC: x / WBC x   Sq Epi: x / Non Sq Epi: x / Bacteria: x      HCG Quantitative, Serum: 516037.0 mIU/mL (24 @ 14:50)    ABO RH Interpretation: O POS (24 @ 14:50)      RADIOLOGY STUDIES: US pending

## 2024-08-09 NOTE — ED PROCEDURE NOTE - ATTENDING CONTRIBUTION TO CARE
I, Nolan Michael, personally saw the patient with the resident, and completed the key components of the history and physical exam. I then discussed the management plan with the resident.

## 2024-08-09 NOTE — ED PROVIDER NOTE - ATTENDING CONTRIBUTION TO CARE
27yo F no PMHx  approximately 11 weeks gestation presents with intractable nausea, vomiting, abdominal pain. Patient's midwife is in [place]. States that she has been having these symptoms for a few days and has been unable to take PO. Her abdominal pain currently is 10/10. Patient was seen in the ED yesterday and returns stating that she is not better and actually feels worse. Denies any diarrhea, endorsing abdominal cramping, no vaginal bleeding.  No fever/chills, No headache, No photophobia/eye pain/changes in vision, No ear pain/sore throat/dysphagia, No chest pain/palpitations, no SOB/cough/wheeze/stridor, no dysuria/frequency/discharge, No neck/back pain, no rash, no changes in neurological status/function. No travel, No sick contacts. Not taking blood thinners.    I, Nolan Michael, evaluated the patient with the PA, and completed the key components of the history and physical exam. I then discussed the management plan with the PA.

## 2024-08-09 NOTE — ED ADULT TRIAGE NOTE - CHIEF COMPLAINT QUOTE
Pt BIBA c/o vomiting, abdominal pain.  Pt is approx 11 weeks pregnant.  Pt was treated yesterday for similar symptoms and told to return if symptoms worsened.

## 2024-08-09 NOTE — ED ADULT NURSE NOTE - OBJECTIVE STATEMENT
pt AOx4, breathing even and unlabored. assumed care 1421. pt presents to ED c/o abd pain. pt states 11 weeks pregnant, 10/10 pain started yesterday, visited ED yesterday, pain exacerbating today. pending USG IV, labs, meds, US.

## 2024-08-09 NOTE — ED PROVIDER NOTE - CLINICAL SUMMARY MEDICAL DECISION MAKING FREE TEXT BOX
29yo F no PMHx  approximately 11 weeks gestation presents with intractable nausea, vomiting, abdominal pain.     Likely hyperemesis gravidarum. Will obtain cbc cmp lipase vbg lactate hcg, US abdomen complete, US pelvis, will administer fluids, start D10, give pepcid per patient request, give reglan and ofirmev, consult OBGYN. 29yo F no PMHx  approximately 11 weeks gestation presents with intractable nausea, vomiting, abdominal pain.     Likely hyperemesis gravidarum. Will obtain cbc cmp lipase vbg lactate hcg, US abdomen complete, US pelvis, will administer fluids, start D10, give pepcid per patient request, give reglan and ofirmev, consult OBGYN.    Patient signed out to incoming physician.  All decisions regarding the progression of care will be made at their discretion. 29yo F no PMHx  approximately 11 weeks gestation presents with intractable nausea, vomiting, abdominal pain.     Likely hyperemesis gravidarum. Will obtain cbc cmp lipase vbg lactate hcg, US abdomen complete, US pelvis, will administer fluids, start D10, give pepcid per patient request, give reglan and ofirmev, consult OBGYN.    Patient signed out to incoming physician.  All decisions regarding the progression of care will be made at their discretion.    leukocytosis with left shift. anion gap initially elevated, closed after fluids; as well as lactate. IUP showed- 11 wks GA, +fhr 165, small subchorionic hemorrhage visualzed. pelvic rest advised. instructed no heavy lifting. OB was consulted- No need for acute inpatient admission or intervention at this time given response to medical therapy. improvement after meds. strict return precautions explained.

## 2024-08-09 NOTE — ED PROVIDER NOTE - PROGRESS NOTE DETAILS
Harini: OB/GYN consulted. Gabbie ATTG Patient still requiring PRNs for nausea/vomiting will repeat BMP for anion gap check.  Will likely either ops or have OB/GYN speak with hospitalist in regards to admission.  Patient now although started to have breakthrough symptoms.    spoke w/ hosp rejected pt given pt has primimarly ob complaint causing symptoms, received signout from dr. rich. will continue to follow pt. Pt reassessed, pt feeling better at this time, vss, pt able to walk, talk and vocalized plan of action. Discussed in depth and explained to pt in depth the next steps that need to be taking including proper follow up with PCP or specialists. All incidental findings were discussed with pt as well. Pt verbalized their concerns and all questions were answered. Pt understands dispo and wants discharge. Given good instructions when to return to ED, strict return precautions and importance of f/u.

## 2024-08-09 NOTE — ED PROVIDER NOTE - OBJECTIVE STATEMENT
27yo F no PMHx  approximately 11 weeks gestation presents with intractable nausea, vomiting, abdominal pain. Patient's midwife is in [place]. States that she has been having these symptoms for a few days and has been unable to take PO. Her abdominal pain currently is 10/10. Denies any diarrhea, endorsing abdominal cramping, no vaginal bleeding.  No fever/chills, No headache, No photophobia/eye pain/changes in vision, No ear pain/sore throat/dysphagia, No chest pain/palpitations, no SOB/cough/wheeze/stridor, no dysuria/frequency/discharge, No neck/back pain, no rash, no changes in neurological status/function. No travel, No sick contacts. Not taking blood thinners.

## 2024-08-09 NOTE — ED PROVIDER NOTE - PATIENT PORTAL LINK FT
You can access the FollowMyHealth Patient Portal offered by [facility] by registering at the following website: [email or web address]. By joining [employer]’s FollowMyHealth portal, you will also be able to view your health information using other applications (apps) compatible with our system.

## 2024-08-10 LAB
ANION GAP SERPL CALC-SCNC: 15 MMOL/L — SIGNIFICANT CHANGE UP (ref 5–17)
BUN SERPL-MCNC: <3 MG/DL — LOW (ref 8–20)
CALCIUM SERPL-MCNC: 8.6 MG/DL — SIGNIFICANT CHANGE UP (ref 8.4–10.5)
CHLORIDE SERPL-SCNC: 106 MMOL/L — SIGNIFICANT CHANGE UP (ref 96–108)
CO2 SERPL-SCNC: 16 MMOL/L — LOW (ref 22–29)
CREAT SERPL-MCNC: 0.38 MG/DL — LOW (ref 0.5–1.3)
EGFR: 142 ML/MIN/1.73M2 — SIGNIFICANT CHANGE UP
GLUCOSE SERPL-MCNC: 91 MG/DL — SIGNIFICANT CHANGE UP (ref 70–99)
POTASSIUM SERPL-MCNC: 4 MMOL/L — SIGNIFICANT CHANGE UP (ref 3.5–5.3)
POTASSIUM SERPL-SCNC: 4 MMOL/L — SIGNIFICANT CHANGE UP (ref 3.5–5.3)
SODIUM SERPL-SCNC: 137 MMOL/L — SIGNIFICANT CHANGE UP (ref 135–145)

## 2024-08-10 RX ORDER — ONDANSETRON HCL/PF 4 MG/2 ML
1 VIAL (ML) INJECTION
Qty: 1 | Refills: 0
Start: 2024-08-10 | End: 2024-08-14

## 2024-08-10 RX ADMIN — Medication 400 MILLIGRAM(S): at 00:28

## 2024-08-10 RX ADMIN — Medication 25 MILLIGRAM(S): at 00:28

## 2024-08-10 RX ADMIN — Medication 10 MILLIGRAM(S): at 00:28

## 2024-09-05 NOTE — ED PROVIDER NOTE - NS ED MD DISPO DISCHARGE
Detail Level: Detailed Quality 226: Preventive Care And Screening: Tobacco Use: Screening And Cessation Intervention: Patient screened for tobacco use and is an ex/non-smoker Quality 130: Documentation Of Current Medications In The Medical Record: Current Medications Documented Quality 431: Preventive Care And Screening: Unhealthy Alcohol Use - Screening: Patient not identified as an unhealthy alcohol user when screened for unhealthy alcohol use using a systematic screening method Home

## 2024-09-15 ENCOUNTER — EMERGENCY (EMERGENCY)
Facility: HOSPITAL | Age: 29
LOS: 1 days | Discharge: HOME | End: 2024-09-15
Attending: EMERGENCY MEDICINE
Payer: COMMERCIAL

## 2024-09-15 VITALS
TEMPERATURE: 98 F | RESPIRATION RATE: 16 BRPM | SYSTOLIC BLOOD PRESSURE: 114 MMHG | OXYGEN SATURATION: 97 % | HEART RATE: 77 BPM | WEIGHT: 118.17 LBS | HEIGHT: 64 IN | DIASTOLIC BLOOD PRESSURE: 73 MMHG

## 2024-09-15 LAB
ALBUMIN SERPL ELPH-MCNC: 3.7 G/DL — SIGNIFICANT CHANGE UP (ref 3.3–5.2)
ALP SERPL-CCNC: 37 U/L — LOW (ref 40–120)
ALT FLD-CCNC: 8 U/L — SIGNIFICANT CHANGE UP
ANION GAP SERPL CALC-SCNC: 13 MMOL/L — SIGNIFICANT CHANGE UP (ref 5–17)
APPEARANCE UR: CLEAR — SIGNIFICANT CHANGE UP
AST SERPL-CCNC: 20 U/L — SIGNIFICANT CHANGE UP
BACTERIA # UR AUTO: NEGATIVE /HPF — SIGNIFICANT CHANGE UP
BASOPHILS # BLD AUTO: 0.02 K/UL — SIGNIFICANT CHANGE UP (ref 0–0.2)
BASOPHILS NFR BLD AUTO: 0.2 % — SIGNIFICANT CHANGE UP (ref 0–2)
BILIRUB SERPL-MCNC: <0.2 MG/DL — LOW (ref 0.4–2)
BILIRUB UR-MCNC: NEGATIVE — SIGNIFICANT CHANGE UP
BLD GP AB SCN SERPL QL: SIGNIFICANT CHANGE UP
BUN SERPL-MCNC: 4.3 MG/DL — LOW (ref 8–20)
CALCIUM SERPL-MCNC: 9.5 MG/DL — SIGNIFICANT CHANGE UP (ref 8.4–10.5)
CAST: 0 /LPF — SIGNIFICANT CHANGE UP (ref 0–4)
CHLORIDE SERPL-SCNC: 102 MMOL/L — SIGNIFICANT CHANGE UP (ref 96–108)
CO2 SERPL-SCNC: 21 MMOL/L — LOW (ref 22–29)
COLOR SPEC: YELLOW — SIGNIFICANT CHANGE UP
CREAT SERPL-MCNC: 0.46 MG/DL — LOW (ref 0.5–1.3)
DIFF PNL FLD: NEGATIVE — SIGNIFICANT CHANGE UP
EGFR: 135 ML/MIN/1.73M2 — SIGNIFICANT CHANGE UP
EOSINOPHIL # BLD AUTO: 0.26 K/UL — SIGNIFICANT CHANGE UP (ref 0–0.5)
EOSINOPHIL NFR BLD AUTO: 2.6 % — SIGNIFICANT CHANGE UP (ref 0–6)
GLUCOSE SERPL-MCNC: 79 MG/DL — SIGNIFICANT CHANGE UP (ref 70–99)
GLUCOSE UR QL: NEGATIVE MG/DL — SIGNIFICANT CHANGE UP
HCG SERPL-ACNC: HIGH MIU/ML
HCT VFR BLD CALC: 34.9 % — SIGNIFICANT CHANGE UP (ref 34.5–45)
HGB BLD-MCNC: 12 G/DL — SIGNIFICANT CHANGE UP (ref 11.5–15.5)
IMM GRANULOCYTES NFR BLD AUTO: 0.4 % — SIGNIFICANT CHANGE UP (ref 0–0.9)
KETONES UR-MCNC: NEGATIVE MG/DL — SIGNIFICANT CHANGE UP
LEUKOCYTE ESTERASE UR-ACNC: NEGATIVE — SIGNIFICANT CHANGE UP
LYMPHOCYTES # BLD AUTO: 1.83 K/UL — SIGNIFICANT CHANGE UP (ref 1–3.3)
LYMPHOCYTES # BLD AUTO: 18.5 % — SIGNIFICANT CHANGE UP (ref 13–44)
MCHC RBC-ENTMCNC: 26.1 PG — LOW (ref 27–34)
MCHC RBC-ENTMCNC: 34.4 GM/DL — SIGNIFICANT CHANGE UP (ref 32–36)
MCV RBC AUTO: 76 FL — LOW (ref 80–100)
MONOCYTES # BLD AUTO: 0.72 K/UL — SIGNIFICANT CHANGE UP (ref 0–0.9)
MONOCYTES NFR BLD AUTO: 7.3 % — SIGNIFICANT CHANGE UP (ref 2–14)
NEUTROPHILS # BLD AUTO: 7 K/UL — SIGNIFICANT CHANGE UP (ref 1.8–7.4)
NEUTROPHILS NFR BLD AUTO: 71 % — SIGNIFICANT CHANGE UP (ref 43–77)
NITRITE UR-MCNC: NEGATIVE — SIGNIFICANT CHANGE UP
PH UR: 8 — SIGNIFICANT CHANGE UP (ref 5–8)
PLATELET # BLD AUTO: 300 K/UL — SIGNIFICANT CHANGE UP (ref 150–400)
POTASSIUM SERPL-MCNC: 4.4 MMOL/L — SIGNIFICANT CHANGE UP (ref 3.5–5.3)
POTASSIUM SERPL-SCNC: 4.4 MMOL/L — SIGNIFICANT CHANGE UP (ref 3.5–5.3)
PROT SERPL-MCNC: 6.5 G/DL — LOW (ref 6.6–8.7)
PROT UR-MCNC: NEGATIVE MG/DL — SIGNIFICANT CHANGE UP
RBC # BLD: 4.59 M/UL — SIGNIFICANT CHANGE UP (ref 3.8–5.2)
RBC # FLD: 14.3 % — SIGNIFICANT CHANGE UP (ref 10.3–14.5)
RBC CASTS # UR COMP ASSIST: 0 /HPF — SIGNIFICANT CHANGE UP (ref 0–4)
SODIUM SERPL-SCNC: 136 MMOL/L — SIGNIFICANT CHANGE UP (ref 135–145)
SP GR SPEC: 1.01 — SIGNIFICANT CHANGE UP (ref 1–1.03)
SQUAMOUS # UR AUTO: 3 /HPF — SIGNIFICANT CHANGE UP (ref 0–5)
UROBILINOGEN FLD QL: 0.2 MG/DL — SIGNIFICANT CHANGE UP (ref 0.2–1)
WBC # BLD: 9.87 K/UL — SIGNIFICANT CHANGE UP (ref 3.8–10.5)
WBC # FLD AUTO: 9.87 K/UL — SIGNIFICANT CHANGE UP (ref 3.8–10.5)
WBC UR QL: 2 /HPF — SIGNIFICANT CHANGE UP (ref 0–5)

## 2024-09-15 RX ORDER — SODIUM CHLORIDE 9 MG/ML
1000 INJECTION INTRAMUSCULAR; INTRAVENOUS; SUBCUTANEOUS ONCE
Refills: 0 | Status: COMPLETED | OUTPATIENT
Start: 2024-09-15 | End: 2024-09-15

## 2024-09-15 RX ADMIN — SODIUM CHLORIDE 2000 MILLILITER(S): 9 INJECTION INTRAMUSCULAR; INTRAVENOUS; SUBCUTANEOUS at 17:54

## 2024-09-15 NOTE — ED PROVIDER NOTE - PATIENT PORTAL LINK FT
You can access the FollowMyHealth Patient Portal offered by [facility] by registering at the following website: [email or web address]. By joining [employer]’s FollowMyHealth portal, you will also be able to view your health information using other applications (apps) compatible with our system.

## 2024-09-15 NOTE — ED PROVIDER NOTE - CLINICAL SUMMARY MEDICAL DECISION MAKING FREE TEXT BOX
29 y/o F  p/w c/o generalized lower abdominal cramping x 1 week. Labs reviewed, grossly unremarkable.   HCG elevated 43,322  T&S O RH positive  UA unremarkable.   TVUS: Single live intrauterine gestation with fetal heart rate of 157 bpm. Sonographic gestational age of 16 weeks 6 days, concordant with clinical dates.  All pertinent results explained to patient. Instructed to f/u with her OBGYN at [facility] in [place] within 2-3 days.   Strict ED return precautions given if any new or worsening symptoms.

## 2024-09-15 NOTE — ED PROVIDER NOTE - PHYSICAL EXAMINATION
Constitutional: Awake, alert and oriented. In no acute distress. Well appearing.  HEENT: NC/AT. Moist mucous membranes.  Eyes: No scleral icterus. EOMI.  Neck:. Soft and supple. Full ROM without pain.  Cardiac: Regular rate and regular rhythm. +S1/S2. Peripheral pulses 2+ and symmetric. No LE edema.  Respiratory: Speaking in full sentences. No evidence of respiratory distress. No wheezes, rales or rhonchi.  Abdomen: (+) gravid abdomen. Non tender. Normal bowel sounds in all 4 quadrants. No guarding or rebound. no CVAT  Back: Spine midline and non-tender.   Skin: No rashes, abrasions or lacerations.  Lymph: No cervical lymphadenopathy.  Neuro: Awake, alert & oriented x 3.   Psych: calm, cooperative, normal affect

## 2024-09-15 NOTE — ED PROVIDER NOTE - NSFOLLOWUPINSTRUCTIONS_ED_ALL_ED_FT
1)Follow up with your  OBGYN within 2-3 days.   Return to the emergency room if you are experiencing any new or worsening symptoms      SEEK IMMEDIATE MEDICAL CARE IF YOU HAVE ANY OF THE FOLLOWING SYMPTOMS: heavy vaginal bleeding, severe low back or abdominal cramps, fever/chills, lightheadedness/dizziness, or fainting.

## 2024-09-15 NOTE — ED PROVIDER NOTE - OBJECTIVE STATEMENT
27 y/o F  p/w c/o generalized lower abdominal cramping x 1 week. Pt went to urgent care this week, had negative urine culture. Thinks "baby heart is not moving". Denies n/v, fever/chills, sob, chest pain, cough, vaginal bleeding, back pain, urinary symptoms, dizziness, weakness, headaches, rash, and with no other c/o.

## 2024-09-15 NOTE — ED PROVIDER NOTE - ATTENDING APP SHARED VISIT CONTRIBUTION OF CARE
indep eval  pt with + 17 week pregnancy is here bc has not felt movement in a week    no bleeding but lower abd discomfort  pe no focal findings  plan labs ua US  US + FHT  agree w maribell and chavez

## 2024-09-16 LAB
CULTURE RESULTS: SIGNIFICANT CHANGE UP
SPECIMEN SOURCE: SIGNIFICANT CHANGE UP

## 2024-09-20 ENCOUNTER — EMERGENCY (EMERGENCY)
Facility: HOSPITAL | Age: 29
LOS: 1 days | Discharge: HOME | End: 2024-09-20
Attending: EMERGENCY MEDICINE
Payer: COMMERCIAL

## 2024-09-20 VITALS
HEIGHT: 64 IN | RESPIRATION RATE: 18 BRPM | OXYGEN SATURATION: 98 % | DIASTOLIC BLOOD PRESSURE: 73 MMHG | WEIGHT: 109.35 LBS | SYSTOLIC BLOOD PRESSURE: 110 MMHG | TEMPERATURE: 98 F | HEART RATE: 96 BPM

## 2024-09-20 LAB
ALBUMIN SERPL ELPH-MCNC: 3.8 G/DL — SIGNIFICANT CHANGE UP (ref 3.3–5.2)
ALP SERPL-CCNC: 37 U/L — LOW (ref 40–120)
ALT FLD-CCNC: 11 U/L — SIGNIFICANT CHANGE UP
ANION GAP SERPL CALC-SCNC: 17 MMOL/L — SIGNIFICANT CHANGE UP (ref 5–17)
AST SERPL-CCNC: 28 U/L — SIGNIFICANT CHANGE UP
BASOPHILS # BLD AUTO: 0.02 K/UL — SIGNIFICANT CHANGE UP (ref 0–0.2)
BASOPHILS NFR BLD AUTO: 0.2 % — SIGNIFICANT CHANGE UP (ref 0–2)
BILIRUB SERPL-MCNC: 0.4 MG/DL — SIGNIFICANT CHANGE UP (ref 0.4–2)
BLD GP AB SCN SERPL QL: SIGNIFICANT CHANGE UP
BUN SERPL-MCNC: 5.5 MG/DL — LOW (ref 8–20)
CALCIUM SERPL-MCNC: 9.8 MG/DL — SIGNIFICANT CHANGE UP (ref 8.4–10.5)
CHLORIDE SERPL-SCNC: 100 MMOL/L — SIGNIFICANT CHANGE UP (ref 96–108)
CO2 SERPL-SCNC: 16 MMOL/L — LOW (ref 22–29)
CREAT SERPL-MCNC: 0.4 MG/DL — LOW (ref 0.5–1.3)
EGFR: 140 ML/MIN/1.73M2 — SIGNIFICANT CHANGE UP
EOSINOPHIL # BLD AUTO: 0.03 K/UL — SIGNIFICANT CHANGE UP (ref 0–0.5)
EOSINOPHIL NFR BLD AUTO: 0.3 % — SIGNIFICANT CHANGE UP (ref 0–6)
GLUCOSE SERPL-MCNC: 70 MG/DL — SIGNIFICANT CHANGE UP (ref 70–99)
HCG SERPL-ACNC: HIGH MIU/ML
HCT VFR BLD CALC: 34.9 % — SIGNIFICANT CHANGE UP (ref 34.5–45)
HGB BLD-MCNC: 12.2 G/DL — SIGNIFICANT CHANGE UP (ref 11.5–15.5)
IMM GRANULOCYTES NFR BLD AUTO: 0.3 % — SIGNIFICANT CHANGE UP (ref 0–0.9)
LACTATE BLDV-MCNC: 1.3 MMOL/L — SIGNIFICANT CHANGE UP (ref 0.5–2)
LIDOCAIN IGE QN: 22 U/L — SIGNIFICANT CHANGE UP (ref 22–51)
LYMPHOCYTES # BLD AUTO: 1.21 K/UL — SIGNIFICANT CHANGE UP (ref 1–3.3)
LYMPHOCYTES # BLD AUTO: 13.5 % — SIGNIFICANT CHANGE UP (ref 13–44)
MCHC RBC-ENTMCNC: 26.5 PG — LOW (ref 27–34)
MCHC RBC-ENTMCNC: 35 GM/DL — SIGNIFICANT CHANGE UP (ref 32–36)
MCV RBC AUTO: 75.9 FL — LOW (ref 80–100)
MONOCYTES # BLD AUTO: 0.66 K/UL — SIGNIFICANT CHANGE UP (ref 0–0.9)
MONOCYTES NFR BLD AUTO: 7.4 % — SIGNIFICANT CHANGE UP (ref 2–14)
NEUTROPHILS # BLD AUTO: 7.01 K/UL — SIGNIFICANT CHANGE UP (ref 1.8–7.4)
NEUTROPHILS NFR BLD AUTO: 78.3 % — HIGH (ref 43–77)
PLATELET # BLD AUTO: 319 K/UL — SIGNIFICANT CHANGE UP (ref 150–400)
POTASSIUM SERPL-MCNC: 4.3 MMOL/L — SIGNIFICANT CHANGE UP (ref 3.5–5.3)
POTASSIUM SERPL-SCNC: 4.3 MMOL/L — SIGNIFICANT CHANGE UP (ref 3.5–5.3)
PROT SERPL-MCNC: 7.1 G/DL — SIGNIFICANT CHANGE UP (ref 6.6–8.7)
RBC # BLD: 4.6 M/UL — SIGNIFICANT CHANGE UP (ref 3.8–5.2)
RBC # FLD: 14.1 % — SIGNIFICANT CHANGE UP (ref 10.3–14.5)
SODIUM SERPL-SCNC: 133 MMOL/L — LOW (ref 135–145)
WBC # BLD: 8.96 K/UL — SIGNIFICANT CHANGE UP (ref 3.8–10.5)
WBC # FLD AUTO: 8.96 K/UL — SIGNIFICANT CHANGE UP (ref 3.8–10.5)

## 2024-09-20 RX ORDER — FAMOTIDINE 10 MG/ML
20 INJECTION INTRAVENOUS ONCE
Refills: 0 | Status: COMPLETED | OUTPATIENT
Start: 2024-09-20 | End: 2024-09-20

## 2024-09-20 RX ORDER — METOCLOPRAMIDE HCL 5 MG
10 TABLET ORAL ONCE
Refills: 0 | Status: COMPLETED | OUTPATIENT
Start: 2024-09-20 | End: 2024-09-20

## 2024-09-20 RX ORDER — SODIUM CHLORIDE 9 MG/ML
1000 INJECTION INTRAMUSCULAR; INTRAVENOUS; SUBCUTANEOUS ONCE
Refills: 0 | Status: COMPLETED | OUTPATIENT
Start: 2024-09-20 | End: 2024-09-20

## 2024-09-20 RX ADMIN — SODIUM CHLORIDE 1000 MILLILITER(S): 9 INJECTION INTRAMUSCULAR; INTRAVENOUS; SUBCUTANEOUS at 18:36

## 2024-09-20 RX ADMIN — Medication 10 MILLIGRAM(S): at 18:37

## 2024-09-20 NOTE — ED PROVIDER NOTE - PATIENT PORTAL LINK FT
You can access the FollowMyHealth Patient Portal offered by [facility] by registering at the following website: [email or web address]. By joining [facility]’s FollowMyHealth portal, you will also be able to view your health information using other applications (apps) compatible with our system.

## 2024-09-20 NOTE — ED ADULT NURSE NOTE - NSFALLUNIVINTERV_ED_ALL_ED
Bed/Stretcher in lowest position, wheels locked, appropriate side rails in place/Call bell, personal items and telephone in reach/Instruct patient to call for assistance before getting out of bed/chair/stretcher/Non-slip footwear applied when patient is off stretcher/[place] to call system/Physically safe environment - no spills, clutter or unnecessary equipment/Purposeful proactive rounding/Room/bathroom lighting operational, light cord in reach

## 2024-09-20 NOTE — ED PROVIDER NOTE - OBJECTIVE STATEMENT
28yoF; G1PO @17 weeks; now p/w abd pain and vomiting. patient states she was diagnosed with hyperemesis gravidarum.  patient has used Diclegis and Zofran at home with no improvement.  states she has also used Compazine, but has run out of the Compazine. patient states reglan works best for her. c/o abd pain--epigastric, radiating to bilateral lower abdomen. denies vaginal bleeding. denies dysuria, frequency, urgency. denies f/c/s. denies cp/sob/palp.  patient currently follows with a Midwife in [place].

## 2024-09-20 NOTE — ED PROVIDER NOTE - CLINICAL SUMMARY MEDICAL DECISION MAKING FREE TEXT BOX
(PETER Chaves MD) Initial Assessment:  28yoF; G1PO @17 weeks; now p/w abd pain and vomiting. patient states she was diagnosed with hyperemesis gravidarum.  patient has used Diclegis and Zofran at home with no improvement.  states she has also used Compazine, but has run out of the Compazine. patient states reglan works best for her. c/o abd pain--epigastric, radiating to bilateral lower abdomen. denies vaginal bleeding.  randa celestinmesis, will eval for pancreatitis and eval pregnancy given lower abd pain.      ACP to complete summary of medical encounter below.  Summary of Clinical Encounter: (PETER Chaves MD) Initial Assessment:  28yoF; G1PO @17 weeks; now p/w abd pain and vomiting. patient states she was diagnosed with hyperemesis gravidarum.  patient has used Diclegis and Zofran at home with no improvement.  states she has also used Compazine, but has run out of the Compazine. patient states reglan works best for her. c/o abd pain--epigastric, radiating to bilateral lower abdomen. denies vaginal bleeding.  likley hyperemesis, will eval for pancreatitis and eval pregnancy given lower abd pain.      ACP to complete summary of medical encounter below.  Summary of Clinical Encounter:    pt feeling better, gyn consulted, meds sent    Pt reassessed, pt feeling better at this time, vss, pt able to walk, talk and vocalized plan of action. Discussed in depth and explained to pt in depth the next steps that need to be taken including proper follow up with PCP or specialists. All incidental findings were discussed with pt as well. Pt verbalized their concerns and all questions were answered. Pt understands dispo and wants discharge. Given good instructions when to return to ED, strict return precautions and importance of f/u.

## 2024-09-20 NOTE — ED PROVIDER NOTE - PHYSICAL EXAMINATION
General:     NAD  Head:     NC/AT, EOMI, oral mucosa moist  Neck:     trachea midline  Lungs:     CTA b/l, no w/r/r  CVS:     S1S2, RRR, no m/g/r  Abd:     +BS, s/ND.  ttp @ epigastrium.  ttp @ suprapubic.   Ext:    2+ radial and pedal pulses, no c/c/e  Neuro: grossly intact

## 2024-09-20 NOTE — ED ADULT NURSE NOTE - BREATHING, MLM
76y f h/o AF s/p ablation @ [facility] 5/7/18 on xarelto, multaq, metoprolol, lasix, htn, hl, hypothyroidism p/w dizziness since 6am. Pt woke up in bed w/sensation of room spinning, worse w/mvmt & lying back, accomp by nausea. Went to bed @ 10p yest in nl soh. Denies ha, vision changes, neck pain, cp/sob, palpitations, cough, hemoptysis, v/d, abd pain, FNDs. PMD/Cardio in SI - Dr. Lyn.
Spontaneous, unlabored and symmetrical

## 2024-09-21 VITALS
HEART RATE: 64 BPM | DIASTOLIC BLOOD PRESSURE: 61 MMHG | TEMPERATURE: 98 F | OXYGEN SATURATION: 98 % | SYSTOLIC BLOOD PRESSURE: 100 MMHG | RESPIRATION RATE: 18 BRPM

## 2024-09-21 LAB
APPEARANCE UR: CLEAR — SIGNIFICANT CHANGE UP
BILIRUB UR-MCNC: NEGATIVE — SIGNIFICANT CHANGE UP
COLOR SPEC: YELLOW — SIGNIFICANT CHANGE UP
DIFF PNL FLD: NEGATIVE — SIGNIFICANT CHANGE UP
GLUCOSE UR QL: NEGATIVE MG/DL — SIGNIFICANT CHANGE UP
KETONES UR-MCNC: 80 MG/DL
LEUKOCYTE ESTERASE UR-ACNC: NEGATIVE — SIGNIFICANT CHANGE UP
NITRITE UR-MCNC: NEGATIVE — SIGNIFICANT CHANGE UP
PH UR: 6 — SIGNIFICANT CHANGE UP (ref 5–8)
PROT UR-MCNC: NEGATIVE MG/DL — SIGNIFICANT CHANGE UP
SP GR SPEC: 1.01 — SIGNIFICANT CHANGE UP (ref 1–1.03)
UROBILINOGEN FLD QL: 0.2 MG/DL — SIGNIFICANT CHANGE UP (ref 0.2–1)

## 2024-09-21 RX ORDER — MAGNESIUM, ALUMINUM HYDROXIDE 200-225/5
30 SUSPENSION, ORAL (FINAL DOSE FORM) ORAL ONCE
Refills: 0 | Status: COMPLETED | OUTPATIENT
Start: 2024-09-21 | End: 2024-09-21

## 2024-09-21 RX ADMIN — Medication 30 MILLILITER(S): at 01:30

## 2024-09-21 RX ADMIN — SODIUM CHLORIDE 1000 MILLILITER(S): 9 INJECTION INTRAMUSCULAR; INTRAVENOUS; SUBCUTANEOUS at 00:47

## 2024-09-21 RX ADMIN — Medication 104 MILLIGRAM(S): at 01:30

## 2024-09-21 RX ADMIN — FAMOTIDINE 20 MILLIGRAM(S): 10 INJECTION INTRAVENOUS at 00:13

## 2024-09-21 NOTE — CONSULT NOTE ADULT - ASSESSMENT
DOMINIC MABRY 27 yo woman  17w3d  w/cc nausea/vomiting and abd pain.     General: a/ox4, appeared in minor discomfort  CV/Pulm: regular rate and rhythm, full chest expansion with no WOB/SOB, denies chest pain and dyspnea   ABD: soft, tenderness upon palp to periumbilical region and LLQ/RLQ w/o radiation or rebound tenderness.   Extremities: distal pulses 3+, neg LE edema     VS: 96 bpm RRR, 110/73, 18bpm, SpO2 98% on room air, 36.9C    symptoms consistent with established diagnosis hypermesis gravidum.     PLAN  [] r/o OB comp  [] discharge with compazine prescription  [] f/u with outpatient OB provider  DOMINIC CLOTILDE 27 yo woman  17w3d  w/cc nausea/vomiting and abd pain.     General: a/ox4, appeared in minor discomfort  CV/Pulm: regular rate and rhythm, full chest expansion with no WOB/SOB, denies chest pain and dyspnea   ABD: soft, tenderness upon palp to periumbilical region and LLQ/RLQ w/o radiation or rebound tenderness.   Extremities: distal pulses 3+, neg LE edema     VS: 96 bpm RRR, 110/73, 18bpm, SpO2 98% on room air, 36.9C    symptoms consistent with established diagnosis hypermesis gravidum.     PLAN  [] r/o OB comp  [] discharge with prescription for compazine  [] f/u with outpatient OB provider  DOMINIC MABRY 29 yo woman  17w3d  w/cc nausea/vomiting and abd pain.     General: a/ox4, appeared in minor discomfort  CV/Pulm: regular rate and rhythm, full chest expansion with no WOB/SOB, denies chest pain and dyspnea   ABD: soft, distended w/tenderness upon palp to periumbilical region and LLQ/RLQ w/o radiation or rebound tenderness. Liver appreciated at costal margin, spleen non palpable.   Extremities: distal pulses 3+, neg LE edema     VS: 96 bpm RRR, 110/73, 18bpm, SpO2 98% on room air, 36.9C    symptoms consistent with established diagnosis hypermesis gravidum.     PLAN  [] r/o OB comp  [] discharge with prescription for compazine  [] f/u with outpatient OB provider  DOMINIC MABRY 27 yo  at 17w3d complaining of persistent nausea/vomiting along with abd pain.     -Vital signs stable, afebrile, normotensive, normocardic   -Physical exam unremarkable  -Electrolytes within normal limits  -US within normal limits with   -Resent compazine. Discussed restarting regimen which controlled her symptoms. Stable for dc home

## 2024-09-21 NOTE — CONSULT NOTE ADULT - SUBJECTIVE AND OBJECTIVE BOX
DOMINIC MABRY 27 yo woman  at 17w3d complaining of persistent nausea/vomiting along with abd pain. Patient states she has been experiencing nausea/vomiting since first trimester and unable to keep food/water down resulting in 15lb weight loss during pregnancy. Patient receives prenatal care and states she has an established diagnosis of Hyperemesis Gravidum, uses Diclegis and Zofran at home w/o improvement. She states that compazine has been effective in controlling symptoms but has run out, and that Reglan works best. Patient report abd pain onset 9/15 with decreasing intensity currently rates 5/10 to lower abdomen without radiation. Patient states she feels hungry despite persistent nausea/vomiting/abd pain and is concerned for ability to tolerate PO feeds. Vomit described as bilious and denies presence of blood. Patient reports no changes in fetal movement, no vaginal bleeding/discharge. Regular bowel movements reported w/uniform consistency and caliber but odorous.   Patient DENIES: recent fevers/illness, known sick contacts, trauma or falls, headache/vision changes, ABD RUQ pain, burning/pain with urination, burning/itching to extremities, increasing extremity edema.    OB/GYN Hx  Patient reports LMP 24, SHERWIN .   Patient states history of uterine cysts w/o treatment   Patient confirms +HPV, last pap on  normal. Denies further STI Hx.         Patient denies use of tobacco products, alcohol, illicit drugs.   Patient reports adherence with prenatal vitamins and f/u with midwife during pregnancy, no reported complications.   Prior H.pylori, C.diff infection at 17 yo resolved with course of antibiotics.   Family history of colon cancer, paternal grandfather.   Endoscopy  with no findings.   Colonoscopy in  with no findings.     Patient received reglan by ED prior to consult with improvement in nausea  DOMINIC MABRY 29 yo  at 17w3d complaining of persistent nausea/vomiting along with abd pain. Patient states she has been experiencing nausea/vomiting since first trimester and unable to keep food/water down resulting in 15lb weight loss during pregnancy. Patient receives prenatal care and states she has an established diagnosis of Hyperemesis Gravidum, uses Diclegis and Zofran at home w/o improvement. She states that compazine has been effective in controlling symptoms but has run out, and that Reglan works best. Patient report abd pain onset 9/15 with decreasing intensity currently rates 5/10 to lower abdomen without radiation. Patient states she feels hungry despite persistent nausea/vomiting/abd pain and is concerned for ability to tolerate PO feeds. Vomit described as bilious and denies presence of blood. Patient reports no changes in fetal movement, no vaginal bleeding/discharge. Regular bowel movements reported w/uniform consistency and caliber but odorous.   Patient DENIES: recent fevers/illness, known sick contacts, trauma or falls, headache/vision changes, ABD RUQ pain, burning/pain with urination, burning/itching to extremities, increasing extremity edema.    OBHx: G1 current LMP 24, SHERWIN , pregnancy c/b HG  GynHx: +HPV, last pap on  normal. Denies further STI Hx.   MedHx: Hx H. pylori, hx C. diff  SurgHx: Denies   Meds: Diclegis, Zofran  Allergies: NKDA      VS: 96 bpm RRR, 110/73, 18bpm, SpO2 98% on room air, 36.9C    General: a/ox4, appeared in minor discomfort  CV/Pulm: regular rate and rhythm, full chest expansion with no WOB/SOB, denies chest pain and dyspnea   ABD: soft, distended w/tenderness upon palp to periumbilical region and LLQ/RLQ w/o radiation or rebound tenderness. Liver appreciated at costal margin, spleen non palpable.   Pelvic exam: Closed, long, posterior cervix  Extremities: distal pulses 3+, neg LE edema     US:

## 2024-09-22 LAB
CULTURE RESULTS: SIGNIFICANT CHANGE UP
SPECIMEN SOURCE: SIGNIFICANT CHANGE UP

## 2024-11-22 ENCOUNTER — OUTPATIENT (OUTPATIENT)
Dept: INPATIENT UNIT | Facility: HOSPITAL | Age: 29
LOS: 1 days | End: 2024-11-22
Payer: COMMERCIAL

## 2024-11-22 VITALS — HEART RATE: 85 BPM | DIASTOLIC BLOOD PRESSURE: 67 MMHG | SYSTOLIC BLOOD PRESSURE: 101 MMHG

## 2024-11-22 VITALS — DIASTOLIC BLOOD PRESSURE: 62 MMHG | SYSTOLIC BLOOD PRESSURE: 105 MMHG | HEART RATE: 81 BPM

## 2024-11-22 DIAGNOSIS — O26.899 OTHER SPECIFIED PREGNANCY RELATED CONDITIONS, UNSPECIFIED TRIMESTER: ICD-10-CM

## 2024-11-22 LAB
ALBUMIN SERPL ELPH-MCNC: 3.6 G/DL — SIGNIFICANT CHANGE UP (ref 3.3–5.2)
ALP SERPL-CCNC: 57 U/L — SIGNIFICANT CHANGE UP (ref 40–120)
ALT FLD-CCNC: 9 U/L — SIGNIFICANT CHANGE UP
AMYLASE P1 CFR SERPL: 139 U/L — HIGH (ref 36–128)
ANION GAP SERPL CALC-SCNC: 14 MMOL/L — SIGNIFICANT CHANGE UP (ref 5–17)
APPEARANCE UR: CLEAR — SIGNIFICANT CHANGE UP
AST SERPL-CCNC: 20 U/L — SIGNIFICANT CHANGE UP
BASOPHILS # BLD AUTO: 0.02 K/UL — SIGNIFICANT CHANGE UP (ref 0–0.2)
BASOPHILS NFR BLD AUTO: 0.2 % — SIGNIFICANT CHANGE UP (ref 0–2)
BILIRUB SERPL-MCNC: <0.2 MG/DL — LOW (ref 0.4–2)
BILIRUB UR-MCNC: NEGATIVE — SIGNIFICANT CHANGE UP
BUN SERPL-MCNC: 5.4 MG/DL — LOW (ref 8–20)
CALCIUM SERPL-MCNC: 9.2 MG/DL — SIGNIFICANT CHANGE UP (ref 8.4–10.5)
CHLORIDE SERPL-SCNC: 103 MMOL/L — SIGNIFICANT CHANGE UP (ref 96–108)
CO2 SERPL-SCNC: 20 MMOL/L — LOW (ref 22–29)
COLOR SPEC: YELLOW — SIGNIFICANT CHANGE UP
CREAT SERPL-MCNC: 0.41 MG/DL — LOW (ref 0.5–1.3)
DIFF PNL FLD: NEGATIVE — SIGNIFICANT CHANGE UP
EGFR: 139 ML/MIN/1.73M2 — SIGNIFICANT CHANGE UP
EOSINOPHIL # BLD AUTO: 0.13 K/UL — SIGNIFICANT CHANGE UP (ref 0–0.5)
EOSINOPHIL NFR BLD AUTO: 1.3 % — SIGNIFICANT CHANGE UP (ref 0–6)
GLUCOSE SERPL-MCNC: 78 MG/DL — SIGNIFICANT CHANGE UP (ref 70–99)
GLUCOSE UR QL: NEGATIVE MG/DL — SIGNIFICANT CHANGE UP
HCT VFR BLD CALC: 32.1 % — LOW (ref 34.5–45)
HGB BLD-MCNC: 11.4 G/DL — LOW (ref 11.5–15.5)
IMM GRANULOCYTES NFR BLD AUTO: 0.4 % — SIGNIFICANT CHANGE UP (ref 0–0.9)
KETONES UR-MCNC: NEGATIVE MG/DL — SIGNIFICANT CHANGE UP
LEUKOCYTE ESTERASE UR-ACNC: NEGATIVE — SIGNIFICANT CHANGE UP
LIDOCAIN IGE QN: 39 U/L — SIGNIFICANT CHANGE UP (ref 22–51)
LYMPHOCYTES # BLD AUTO: 1.61 K/UL — SIGNIFICANT CHANGE UP (ref 1–3.3)
LYMPHOCYTES # BLD AUTO: 15.8 % — SIGNIFICANT CHANGE UP (ref 13–44)
MCHC RBC-ENTMCNC: 26.6 PG — LOW (ref 27–34)
MCHC RBC-ENTMCNC: 35.5 G/DL — SIGNIFICANT CHANGE UP (ref 32–36)
MCV RBC AUTO: 74.8 FL — LOW (ref 80–100)
MONOCYTES # BLD AUTO: 0.83 K/UL — SIGNIFICANT CHANGE UP (ref 0–0.9)
MONOCYTES NFR BLD AUTO: 8.2 % — SIGNIFICANT CHANGE UP (ref 2–14)
NEUTROPHILS # BLD AUTO: 7.53 K/UL — HIGH (ref 1.8–7.4)
NEUTROPHILS NFR BLD AUTO: 74.1 % — SIGNIFICANT CHANGE UP (ref 43–77)
NITRITE UR-MCNC: NEGATIVE — SIGNIFICANT CHANGE UP
PH UR: 8.5 (ref 5–8)
PLATELET # BLD AUTO: 253 K/UL — SIGNIFICANT CHANGE UP (ref 150–400)
POTASSIUM SERPL-MCNC: 3.9 MMOL/L — SIGNIFICANT CHANGE UP (ref 3.5–5.3)
POTASSIUM SERPL-SCNC: 3.9 MMOL/L — SIGNIFICANT CHANGE UP (ref 3.5–5.3)
PROT SERPL-MCNC: 6.7 G/DL — SIGNIFICANT CHANGE UP (ref 6.6–8.7)
PROT UR-MCNC: NEGATIVE MG/DL — SIGNIFICANT CHANGE UP
RBC # BLD: 4.29 M/UL — SIGNIFICANT CHANGE UP (ref 3.8–5.2)
RBC # FLD: 14.1 % — SIGNIFICANT CHANGE UP (ref 10.3–14.5)
SODIUM SERPL-SCNC: 137 MMOL/L — SIGNIFICANT CHANGE UP (ref 135–145)
SP GR SPEC: 1.01 — SIGNIFICANT CHANGE UP (ref 1–1.03)
UROBILINOGEN FLD QL: 1 MG/DL — SIGNIFICANT CHANGE UP (ref 0.2–1)
WBC # BLD: 10.16 K/UL — SIGNIFICANT CHANGE UP (ref 3.8–10.5)
WBC # FLD AUTO: 10.16 K/UL — SIGNIFICANT CHANGE UP (ref 3.8–10.5)

## 2024-11-22 PROCEDURE — 80053 COMPREHEN METABOLIC PANEL: CPT

## 2024-11-22 PROCEDURE — 85025 COMPLETE CBC W/AUTO DIFF WBC: CPT

## 2024-11-22 PROCEDURE — 81003 URINALYSIS AUTO W/O SCOPE: CPT

## 2024-11-22 PROCEDURE — 76705 ECHO EXAM OF ABDOMEN: CPT

## 2024-11-22 PROCEDURE — G0463: CPT

## 2024-11-22 PROCEDURE — 76705 ECHO EXAM OF ABDOMEN: CPT | Mod: 26

## 2024-11-22 PROCEDURE — 82150 ASSAY OF AMYLASE: CPT

## 2024-11-22 PROCEDURE — 83690 ASSAY OF LIPASE: CPT

## 2024-11-22 PROCEDURE — 96360 HYDRATION IV INFUSION INIT: CPT

## 2024-11-22 PROCEDURE — 59025 FETAL NON-STRESS TEST: CPT

## 2024-11-22 PROCEDURE — 36415 COLL VENOUS BLD VENIPUNCTURE: CPT

## 2024-11-22 RX ORDER — 0.9 % SODIUM CHLORIDE 0.9 %
500 INTRAVENOUS SOLUTION INTRAVENOUS ONCE
Refills: 0 | Status: DISCONTINUED | OUTPATIENT
Start: 2024-11-22 | End: 2024-11-23

## 2024-11-22 NOTE — OB RN TRIAGE NOTE - NS_SUPPORTPERSONNAME_OBGYN_ALL_OB_FT
Bong Lowe Ear Wedge Repair Text: A wedge excision was completed by carrying down an excision through the full thickness of the ear and cartilage with an inward facing Burow's triangle. The wound was then closed in a layered fashion.

## 2024-11-22 NOTE — OB PROVIDER TRIAGE NOTE - NSOBPROVIDERNOTE_OBGYN_ALL_OB_FT
29 yo P0 @ 26+ weeks GA with RUQ pain, afebrile, reassuring fetal status  Will check labs  RUQ carlos alberto Marie CNM 27 yo P0 @ 26+ weeks GA with RUQ pain, afebrile, reassuring fetal status  Will check labs  RUQ carlos alberto Marie CNM    1420 - RUQ scan negative, pt feels comfortable going home, she will return if worsens or accompanied by N/V, fever, etc

## 2024-11-22 NOTE — OB PROVIDER TRIAGE NOTE - NSHPPHYSICALEXAM_GEN_ALL_CORE
Comfortable appearing in NAD  breathing easily on room air  /67  abd soft, gravid, +tenderness elicited with RUQ palpation  VE: deferred  BSUS, vtx, BPP 8/8, MVP 6 cm

## 2024-11-22 NOTE — OB PROVIDER TRIAGE NOTE - ADDITIONAL INSTRUCTIONS
please see your provider this coming Monday 11/25/2024, return to the hospital if you develop fever, increased pain or any other concerns

## 2024-11-22 NOTE — OB PROVIDER TRIAGE NOTE - HISTORY OF PRESENT ILLNESS
27 yo  @ 26 2/7 weeks GA presents c/o RUQ x 1 week, reports it as sharp and colicky.  She denies assoc N/V, denies fever/chills, denies change in bowel/bladder habits.  Able to eat and drink ok, last meal at 7 am and tolerated well.  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  She denies ctx pain and denies LOF, VB, endorses good fetal movement.  AP care with [facility] in , states she has uncomplicated prenatal course to date, does have SS trait and associated anemia.  Was to have scheduled prenatal visit today but came here instead.

## 2024-11-22 NOTE — OB RN TRIAGE NOTE - FALL HARM RISK - UNIVERSAL INTERVENTIONS
Bed in lowest position, wheels locked, appropriate side rails in place/Call bell, personal items and telephone in reach/Instruct patient to call for assistance before getting out of bed or chair/Non-slip footwear when patient is out of bed/[place] to call system/Physically safe environment - no spills, clutter or unnecessary equipment/Purposeful Proactive Rounding/Room/bathroom lighting operational, light cord in reach

## 2024-11-22 NOTE — OB PROVIDER TRIAGE NOTE - NSICDXNOPASTSURGICALHX_GEN_ALL_CORE
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 Jeovanny Narvaez at [facility], hx of gambling, no substance use disorder, + recent verbally aggressive behavior. + recent multiple legal issues- speeding citations, brought in by family for LOC. 2 recent falls and seen at [facility] ED last on 8/7/22- suture to head and signed out AMA refusing cardiac/telemetry work up. Psych consulted for medication management. As patient was being followed by C/L team, appeared to be manic and not at his baseline per family. Patient transferred to [facility] for decompensation of bipolar disorder.    On interview, patient has no complaints, does not feel he has a mental illness and does not feel he requires hospitalization. He is calm, superficial, has linear thought process and perseveration on discharge. Given collateral and labile mood seen by C/L team, patient's presentation is concerning for decompensated bipolar disorder in the setting on medication noncompliance. Will continue medication regimen from C/L team of Abilify 20 mg qd and Depakote 500 mg bid.    Patient remains elevated and perseverative about discharge. Denies any SI.  Continue with plan:    Plan:  1.	Legals: admit on 9.27 status.  2.	Safety: routine observation, denies SI/HI/I/P on the unit. Based on risk assessment evaluation, patient IS NOT at acute risk of harm to self or others at this time, DOES NOT require 1:1 CO. PRNs: Ativan 2 mg/Haldol 5 mg/Benadryl 50 mg PO q6h/IM once for agitation.  3.	Psychiatry: Abilify 20 mg qd and Depakote 500 mg bid.  4.	Group, milieu, individual therapy as appropriate.  5.	Medical: no acute medical issues, no significant PMH.  Admission labs WNL.  6.	Dispo: pending clinical improvement.  Patient continues to require inpatient hospitalization for stabilization and safety.   <-- Click to add NO significant Past Surgical History

## 2024-11-22 NOTE — OB PROVIDER TRIAGE NOTE - NSICDXPASTSURGICALHX_GEN_ALL_CORE_FT
Assessment/Plan:    Tinea corporis  Discussed appropriate hygiene, particularly washing without reusing soiled close and taking daily showers.  Will prescribe clotrimazole cream twice daily for 3 weeks as well as Diflucan weekly for 4 weeks.       Diagnoses and all orders for this visit:    Tinea corporis  -     fluconazole (DIFLUCAN) 150 mg tablet; Take 1 tablet (150 mg total) by mouth once a week for 4 doses  -     clotrimazole (LOTRIMIN) 1 % cream; Apply topically 2 (two) times a day for 21 days                  Subjective:      Patient ID: Alexx Haney is a 60 y.o. male.    Chief Complaint   Patient presents with   • Rash     rash on right elbows, where foot meet the leg and also on his bottom seen it last Thursday hasn't tried anything to help it , c/o of getting heat sensitive lately        Alexx Haney is seen today with concern for a rash on his left foot, right arm, and buttocks.   She denies any new pets.   He admits to wearing his socks for multiple days at a time and taking 1 shower every 2-3 days.      Rash  This is a new problem. The current episode started in the past 7 days. The problem has been gradually worsening since onset. The problem is mild. Pertinent negatives include no congestion, cough, diarrhea, fatigue, fever, rhinorrhea, shortness of breath, sore throat or vomiting.       The following portions of the patient's history were reviewed and updated as appropriate: allergies, current medications, past family history, past medical history, past social history, past surgical history, and problem list.    Review of Systems   Constitutional:  Negative for activity change, appetite change, chills, diaphoresis, fatigue and fever.   HENT:  Negative for congestion, postnasal drip, rhinorrhea, sinus pressure, sinus pain, sneezing and sore throat.    Eyes:  Negative for visual disturbance.   Respiratory:  Negative for apnea, cough, choking, chest tightness, shortness of breath and wheezing.     Cardiovascular:  Negative for chest pain, palpitations and leg swelling.   Gastrointestinal:  Negative for abdominal distention, abdominal pain, anal bleeding, blood in stool, constipation, diarrhea, nausea and vomiting.   Endocrine: Negative for cold intolerance and heat intolerance.   Genitourinary:  Negative for difficulty urinating, dysuria and hematuria.   Musculoskeletal: Negative.    Skin:  Positive for rash.   Neurological:  Negative for dizziness, weakness, light-headedness, numbness and headaches.   Hematological:  Negative for adenopathy.   Psychiatric/Behavioral:  Negative for agitation, sleep disturbance and suicidal ideas.    All other systems reviewed and are negative.        Past Medical History:   Diagnosis Date   • Anxiety 1/15/2019   • Arthritis    • Chronic rhinitis     last assessed 2/21/14   • Chronic serous otitis media     last assessed 11/20/13   • Chronic sinusitis     last assessed 8/19/14   • Functional heart murmur    • GERD (gastroesophageal reflux disease)    • Gout    • Hearing problem     last assessed 12/1/16   • Hiatal hernia    • Hypercholesterolemia    • Hypertension    • Palpitations    • Testicular hypogonadism     last assessed 10/4/13   • V-tach (HCC)          Current Outpatient Medications:   •  acetaminophen (TYLENOL) 500 mg tablet, Take 1,000 mg by mouth every 6 (six) hours as needed for mild pain, Disp: , Rfl:   •  albuterol (2.5 mg/3 mL) 0.083 % nebulizer solution, Take 3 mL (2.5 mg total) by nebulization every 6 (six) hours as needed for wheezing or shortness of breath, Disp: 240 mL, Rfl: 2  •  albuterol (PROVENTIL HFA,VENTOLIN HFA) 90 mcg/act inhaler, Inhale 1 puff if needed for wheezing or shortness of breath, Disp: 18 g, Rfl: 1  •  Chlorpheniramine Maleate (CHLOR-TRIMETON ALLERGY PO), Take by mouth as needed, Disp: , Rfl:   •  clotrimazole (LOTRIMIN) 1 % cream, Apply topically 2 (two) times a day for 21 days, Disp: 60 g, Rfl: 3  •  esomeprazole (NexIUM) 40 MG  capsule, Take 40 mg by mouth every morning before breakfast, Disp: , Rfl:   •  febuxostat (ULORIC) 40 mg tablet, Take 1 tablet (40 mg total) by mouth daily, Disp: 90 tablet, Rfl: 1  •  fluconazole (DIFLUCAN) 150 mg tablet, Take 1 tablet (150 mg total) by mouth once a week for 4 doses, Disp: 4 tablet, Rfl: 0  •  fluticasone-salmeterol (Advair) 500-50 mcg/dose inhaler, Inhale 1 puff 2 (two) times a day, Disp: , Rfl:   •  ibuprofen (MOTRIN) 600 mg tablet, Take 600 mg by mouth every 8 (eight) hours as needed for moderate pain, Disp: , Rfl:   •  ipratropium-albuterol (DUO-NEB) 0.5-2.5 mg/3 mL, Inhale 3 mL 2 (two) times a week , Disp: , Rfl:   •  lisinopril (ZESTRIL) 20 mg tablet, Take 1 tablet (20 mg total) by mouth daily, Disp: 90 tablet, Rfl: 1  •  montelukast (SINGULAIR) 10 mg tablet, Take 1 tablet (10 mg total) by mouth daily, Disp: 90 tablet, Rfl: 1  •  Triamcinolone Acetonide (NASACORT ALLERGY 24HR NA), 1 spray into each nostril daily  , Disp: , Rfl:   •  verapamil (CALAN) 120 mg tablet, TAKE 1 TABLET BY MOUTH EVERY DAY, Disp: 90 tablet, Rfl: 3    Allergies   Allergen Reactions   • Penicillins Rash and Anaphylaxis   • Acetazolamide      Other reaction(s): Stomach Ache   • Cephalosporins Other (See Comments)     headache   • Other    • Sulfa Antibiotics Other (See Comments)     Category: Allergy; Annotation - 04Oct2013: STOMACH UPSET   • Famotidine Palpitations   • Levofloxacin Palpitations   • Omeprazole Palpitations     Painful urination       Social History   Past Surgical History:   Procedure Laterality Date   • APPENDECTOMY     • BICEPS TENODESIS      anesthesia for tenodesis- of ruptured long tendon of biceps    • HERNIA REPAIR     • THYROIDECTOMY     • TONSILLECTOMY       Family History   Problem Relation Age of Onset   • Lung cancer Father    • Coronary artery disease Father         blockages   • Stroke Maternal Grandmother    • Cancer Paternal Grandfather         metastasized   • Heart disease Family    •  "Lung cancer Cousin    • No Known Problems Mother    • No Known Problems Sister    • No Known Problems Brother    • No Known Problems Maternal Aunt    • No Known Problems Maternal Uncle    • No Known Problems Paternal Aunt    • No Known Problems Paternal Uncle    • No Known Problems Paternal Grandmother    • ADD / ADHD Neg Hx    • Anesthesia problems Neg Hx    • Clotting disorder Neg Hx    • Collagen disease Neg Hx    • Diabetes Neg Hx    • Dislocations Neg Hx    • Learning disabilities Neg Hx    • Neurological problems Neg Hx    • Osteoporosis Neg Hx    • Rheumatologic disease Neg Hx    • Scoliosis Neg Hx    • Vascular Disease Neg Hx        Objective:  /78 (BP Location: Left arm, Patient Position: Sitting, Cuff Size: Standard)   Pulse 78   Temp 98.8 °F (37.1 °C) (Temporal)   Ht 5' 9\" (1.753 m)   Wt 91.2 kg (201 lb)   SpO2 98%   BMI 29.68 kg/m²     Recent Results (from the past 1344 hour(s))   COMPREHENSIVE METABOLIC PANEL    Collection Time: 04/20/24 10:32 PM   Result Value Ref Range    Glucose 107 (H) 65 - 99 mg/dL    BUN 14 7 - 28 mg/dL    Creatinine 0.80 0.53 - 1.30 mg/dL    Sodium 124 (L) 135 - 145 mmol/L    Potassium 4.0 3.5 - 5.2 mmol/L    Chloride 94 (L) 100 - 109 mmol/L    Carbon Dioxide 22 21 - 31 mmol/L    Calcium 9.5 8.5 - 10.1 mg/dL    Alkaline Phosphatase 66 35 - 120 U/L    ALBUMIN 4.6 3.5 - 5.7 g/dL    Total Bilirubin 0.8 0.2 - 1.0 mg/dL    Protein, Total 6.7 6.3 - 8.3 g/dL    AST 28 <41 U/L    ALT 27 <56 U/L    ANION GAP 8 3 - 11    eGFRcr 102 >59    eGFR Comment Interpretive information: calculated GFR             Physical Exam  Vitals and nursing note reviewed.   Constitutional:       General: He is not in acute distress.     Appearance: He is well-developed. He is not diaphoretic.   HENT:      Head: Normocephalic and atraumatic.   Eyes:      General: No scleral icterus.        Right eye: No discharge.         Left eye: No discharge.      Conjunctiva/sclera: Conjunctivae normal.      " Pupils: Pupils are equal, round, and reactive to light.   Neck:      Thyroid: No thyromegaly.      Vascular: No JVD.   Cardiovascular:      Rate and Rhythm: Normal rate and regular rhythm.      Heart sounds: Normal heart sounds. No murmur heard.     No friction rub. No gallop.   Pulmonary:      Effort: Pulmonary effort is normal. No respiratory distress.      Breath sounds: Normal breath sounds. No wheezing or rales.   Chest:      Chest wall: No tenderness.   Abdominal:      General: Bowel sounds are normal. There is no distension.      Palpations: Abdomen is soft. There is no mass.      Tenderness: There is no abdominal tenderness. There is no guarding or rebound.   Musculoskeletal:         General: No tenderness or deformity. Normal range of motion.      Cervical back: Normal range of motion and neck supple.   Lymphadenopathy:      Cervical: No cervical adenopathy.   Skin:     General: Skin is warm and dry.      Coloration: Skin is not pale.      Findings: No erythema or rash.          Neurological:      Mental Status: He is alert and oriented to person, place, and time.      Cranial Nerves: No cranial nerve deficit.      Coordination: Coordination normal.      Deep Tendon Reflexes: Reflexes are normal and symmetric.   Psychiatric:         Behavior: Behavior normal.         Thought Content: Thought content normal.         Judgment: Judgment normal.          PAST SURGICAL HISTORY:  No significant past surgical history

## 2024-11-25 DIAGNOSIS — O26.892 OTHER SPECIFIED PREGNANCY RELATED CONDITIONS, SECOND TRIMESTER: ICD-10-CM

## 2024-11-25 DIAGNOSIS — O99.012 ANEMIA COMPLICATING PREGNANCY, SECOND TRIMESTER: ICD-10-CM

## 2024-11-25 DIAGNOSIS — D64.9 ANEMIA, UNSPECIFIED: ICD-10-CM

## 2024-11-25 DIAGNOSIS — Z3A.26 26 WEEKS GESTATION OF PREGNANCY: ICD-10-CM

## 2024-11-25 DIAGNOSIS — R10.11 RIGHT UPPER QUADRANT PAIN: ICD-10-CM

## 2024-12-06 ENCOUNTER — OUTPATIENT (OUTPATIENT)
Dept: INPATIENT UNIT | Facility: HOSPITAL | Age: 29
LOS: 1 days | End: 2024-12-06
Payer: COMMERCIAL

## 2024-12-06 VITALS — DIASTOLIC BLOOD PRESSURE: 56 MMHG | SYSTOLIC BLOOD PRESSURE: 97 MMHG | HEART RATE: 67 BPM

## 2024-12-06 VITALS
TEMPERATURE: 99 F | SYSTOLIC BLOOD PRESSURE: 113 MMHG | HEART RATE: 95 BPM | RESPIRATION RATE: 16 BRPM | DIASTOLIC BLOOD PRESSURE: 67 MMHG

## 2024-12-06 DIAGNOSIS — O26.899 OTHER SPECIFIED PREGNANCY RELATED CONDITIONS, UNSPECIFIED TRIMESTER: ICD-10-CM

## 2024-12-06 LAB
ALBUMIN SERPL ELPH-MCNC: 3.7 G/DL — SIGNIFICANT CHANGE UP (ref 3.3–5.2)
ALP SERPL-CCNC: 63 U/L — SIGNIFICANT CHANGE UP (ref 40–120)
ALT FLD-CCNC: 11 U/L — SIGNIFICANT CHANGE UP
ANION GAP SERPL CALC-SCNC: 11 MMOL/L — SIGNIFICANT CHANGE UP (ref 5–17)
AST SERPL-CCNC: 21 U/L — SIGNIFICANT CHANGE UP
BILIRUB SERPL-MCNC: 0.3 MG/DL — LOW (ref 0.4–2)
BUN SERPL-MCNC: 3.2 MG/DL — LOW (ref 8–20)
CALCIUM SERPL-MCNC: 9.3 MG/DL — SIGNIFICANT CHANGE UP (ref 8.4–10.5)
CHLORIDE SERPL-SCNC: 101 MMOL/L — SIGNIFICANT CHANGE UP (ref 96–108)
CO2 SERPL-SCNC: 20 MMOL/L — LOW (ref 22–29)
CREAT SERPL-MCNC: 0.38 MG/DL — LOW (ref 0.5–1.3)
EGFR: 141 ML/MIN/1.73M2 — SIGNIFICANT CHANGE UP
GLUCOSE SERPL-MCNC: 71 MG/DL — SIGNIFICANT CHANGE UP (ref 70–99)
POTASSIUM SERPL-MCNC: 3.4 MMOL/L — LOW (ref 3.5–5.3)
POTASSIUM SERPL-SCNC: 3.4 MMOL/L — LOW (ref 3.5–5.3)
PROT SERPL-MCNC: 7 G/DL — SIGNIFICANT CHANGE UP (ref 6.6–8.7)
RAPID RVP RESULT: SIGNIFICANT CHANGE UP
SARS-COV-2 RNA SPEC QL NAA+PROBE: SIGNIFICANT CHANGE UP
SODIUM SERPL-SCNC: 132 MMOL/L — LOW (ref 135–145)

## 2024-12-06 PROCEDURE — 80053 COMPREHEN METABOLIC PANEL: CPT

## 2024-12-06 PROCEDURE — 59025 FETAL NON-STRESS TEST: CPT

## 2024-12-06 PROCEDURE — 87800 DETECT AGNT MULT DNA DIREC: CPT

## 2024-12-06 PROCEDURE — 0225U NFCT DS DNA&RNA 21 SARSCOV2: CPT

## 2024-12-06 PROCEDURE — 36415 COLL VENOUS BLD VENIPUNCTURE: CPT

## 2024-12-06 PROCEDURE — G0463: CPT

## 2024-12-06 PROCEDURE — 99221 1ST HOSP IP/OBS SF/LOW 40: CPT

## 2024-12-06 PROCEDURE — 96360 HYDRATION IV INFUSION INIT: CPT

## 2024-12-06 RX ORDER — 0.9 % SODIUM CHLORIDE 0.9 %
1000 INTRAVENOUS SOLUTION INTRAVENOUS
Refills: 0 | Status: ACTIVE | OUTPATIENT
Start: 2024-12-06 | End: 2025-11-04

## 2024-12-06 RX ORDER — ONDANSETRON HYDROCHLORIDE 4 MG/1
4 TABLET, FILM COATED ORAL ONCE
Refills: 0 | Status: ACTIVE | OUTPATIENT
Start: 2024-12-06

## 2024-12-06 NOTE — OB PROVIDER TRIAGE NOTE - ATTENDING COMMENTS
29y  at 28 weeks 3 days GA by LMP presents to L&D for URI symptoms and gastrointestinal symptoms improved with IV hydration, viral panel negative but no s/s sepsis, shorio, pyelo, etc, encouraged rest, hydration.  Impression NST: reactive

## 2024-12-06 NOTE — OB PROVIDER TRIAGE NOTE - HISTORY OF PRESENT ILLNESS
29y  at 28 weeks 3 days GA by LMP consistent with first trimester carlos alberto who presents to L&D for upper respiratory symptoms, fever and gastrointestinal symptoms. Patient reports since Monday nasal congestion and non productive cough with no shortness of breath, which partially resolved with Tylenol. Symptoms increased with appearance of fever (99.9) taken at home. Gastrointestinal symptoms started yesterday with watery diarrhea no blood (10 episodes in the last 24 hours), nausea and vomiting with intolerance to PO and colic abdominal pain 8/10 starting in flanks irradiating to epigastrium and mesogastrium, no heartburn or reflux. 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 She endorses good fetal movement. No other complaints at this time.   SHANE: 2025  LMP: 2024    Prenatal course uncomplicated.      POB: G1. Current. Uncomplicated. Female.  PGYN: No fibroids, prior ovarian cysts diagnosed 3 years ago with no symptoms, denies STD hx, abnormal PAPs with normal colposcopy on , last PAPs  normal.   PMH: Denies  PSH: Denies  SH: Denies EtOH, tobacco and illicit drug use during this pregnancy; feels safe at home   Meds: PNVs  Allergies: NKDA 29y  at 28 weeks 3 days GA by LMP presents to L&D for URI symptoms and gastrointestinal symptoms. Patient reports nasal congestion and a nonproductive cough for 5 days. She reports an at-home temperature of 99.9 F and she took 500mg of Tylenol today. She reports watery diarrhea that started yesterday, about 10 episodes with emesis, last episode around 1200 today. She has been unable to tolerate PO today. She has intermittent abdominal pain at a level of 8/10. Patient denies vaginal bleeding, contractions, leakage of fluid and she endorses good fetal movement. No other complaints at this time. Currently, she receives PNC at [facility], operated by [facility] but since relocating to this area, she hopes to establish care with a provider who has privileges at [facility].     SHANE: 2025  LMP: 2024    Prenatal course uncomplicated.      POB:  Current. Uncomplicated. Female.  PGYN: No fibroids, prior ovarian cysts diagnosed 3 years ago with no symptoms, denies STD hx, abnormal PAPs with normal colposcopy on , last PAPs  normal.   PMH: Denies  PSH: Denies  SH: Denies EtOH, tobacco and illicit drug use during this pregnancy; feels safe at home   Meds: PNVs  Allergies: NKDA 29y  at 28 weeks 3 days GA by LMP presents to L&D for URI symptoms and gastrointestinal symptoms. Patient reports nasal congestion and a nonproductive cough for 5 days. She reports an at-home temperature of 99.9 F and she took 500mg of Tylenol today. She reports watery diarrhea that started yesterday, about 10 episodes with emesis, last episode around 1200 today. She has been unable to tolerate PO today. She has intermittent abdominal pain at a level of 8/10. Also notes recent vaginal itching. Patient denies vaginal bleeding, contractions, leakage of fluid and she endorses good fetal movement. No other complaints at this time. Currently, she receives PNC at [facility] but since relocating to this area, she hopes to establish care with a provider who has privileges at [facility].     SHANE: 2025  LMP: 2024    Prenatal course uncomplicated.      POB:  Current. Uncomplicated. Female.  PGYN: No fibroids, prior ovarian cysts diagnosed 3 years ago with no symptoms, denies STD hx, abnormal PAPs with normal colposcopy on , last PAPs  normal.   PMH: Denies  PSH: Denies  SH: Denies EtOH, tobacco and illicit drug use during this pregnancy; feels safe at home   Meds: PNVs  Allergies: NKDA

## 2024-12-06 NOTE — OB RN TRIAGE NOTE - NSDCBPNONINVDIASTOLIC_OBGYN_A_OB_NU
Problem: DISCHARGE PLANNING - CARE MANAGEMENT  Goal: Discharge to post-acute care or home with appropriate resources  INTERVENTIONS:  - Conduct assessment to determine patient/family and health care team treatment goals, and need for post-acute services based on payer coverage, community resources, and patient preferences, and barriers to discharge  - Address psychosocial, clinical, and financial barriers to discharge as identified in assessment in conjunction with the patient/family and health care team  - Arrange appropriate level of post-acute services according to patient's   needs and preference and payer coverage in collaboration with the physician and health care team  - Communicate with and update the patient/family, physician, and health care team regarding progress on the discharge plan  - Arrange appropriate transportation to post-acute venues  - Discharge to home when medically cleared  Outcome: Progressing 56

## 2024-12-06 NOTE — OB PROVIDER TRIAGE NOTE - NSHPLABSRESULTS_GEN_ALL_CORE
12-06    132[L]  |  101  |  3.2[L]  ----------------------------<  71  3.4[L]   |  20.0[L]  |  0.38[L]    Ca    9.3      06 Dec 2024 21:40    TPro  7.0  /  Alb  3.7  /  TBili  0.3[L]  /  DBili  x   /  AST  21  /  ALT  11  /  AlkPhos  63  12-06    Respiratory Viral Panel with COVID-19 by [facility] (12.06.24 @ 17:24)   Rapid RVP Result: [facility]  SARS-CoV-2: [facility]: This Respiratory Panel uses polymerase chain reaction (PCR) to detect for   adenovirus; coronavirus (HKU1, NL63, 229E, OC43); human metapneumovirus   (hMPV); human enterovirus/rhinovirus (Entero/RV); influenza A; influenza   A/H1; influenza A/H3; influenza A/H1-2009; influenza B; parainfluenza   viruses 1, 2, 3, 4; respiratory syncytial virus; Mycoplasma pneumoniae;   Chlamydophila pneumoniae; and SARS-CoV-2.

## 2024-12-06 NOTE — OB RN TRIAGE NOTE - NS_SOURCEOFINFO_OBGYN_ALL_OB
1340 PT RECEIVED IN PACU, REPORT RECEIVED.  VSS, RESP SPONT, EVEN, NON LABORED. PT CRYING, MOANING. REPORTS R HIP PAIN 10/10.     1343 PT REPORTS FEELING ANXIOUS. SEE MAR.    1400 PT REPORTS R HIP PAIN 9/10 AND CONTINUES ANXIETY.     1406 VSS. PT RESTING ON BED WITH EYES CLOSED.     1415 NOTIFY XRAY VIA PHONE.    1425 XRAY AT BEDSIDE.  VSS PT UNSURE THE AMOUNT OF PAIN SHE HAS. SEE FLOW SHEET FOR NON VERBAL ASSESSMENT.     1430 REPORT GIVEN TO HERMES OCAMPO         Patient

## 2024-12-06 NOTE — OB PROVIDER TRIAGE NOTE - NSOBPROVIDERNOTE_OBGYN_ALL_OB_FT
29y  at 28 weeks 3 days who presents upper respiratory symptoms, fever and gastrointestinal symptoms. Nasal congestion and non productive cough with no shortness of breath with watery diarrhea and intolerance to PO. 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 No other complaints at this time.     A/P:   -Observation  -Admission labs  -NPO, except ice chips   -IV fluids  -IV antiemetics  -Analgesia    Discussed with  _ 29y  at 28w3d by LMP evaluated for URI and GI symptoms.     - Maternal VSS (afebrile)  - NST reactive, no contractions on tocometry  - RVP negative, CMP unremarkable  - IVF hydration with resolution of abdominal cramping  - Tolerating PO   - Affirm pending, will follow up with patients if positive results   - Provided contact information for Dr. Arrington, Dr. Lopez and Dr. Conteh for OBGYN follow up    Discussed with Dr. Olivier

## 2024-12-06 NOTE — OB RN TRIAGE NOTE - NSNAMEOFMDOFFICE_OBGYN_ALL_OB_FT
Pharmacy requesting refill of amitriptyline 25 mg.      Medication active on med list yes      Date of last Rx: 12/8/21 with 5 refills          verified by SANA CHILDS      Date of last appointment 11/4/21    Next Visit Date:  None, message left to schedule follow up appointment.
[facility]

## 2024-12-06 NOTE — OB PROVIDER TRIAGE NOTE - NSHPPHYSICALEXAM_GEN_ALL_CORE
T(C): 37 (12-06-24 @ 19:28), Max: 37.0 (12-06-24 @ 19:20)  HR: 95 (12-06-24 @ 19:28) (95 - 95)  BP: 113/67 (12-06-24 @ 19:28) (113/67 - 113/67)  RR: 16 (12-06-24 @ 19:28) (16 - 16)  SpO2: --    Gen: NAD, well-appearing, AAOx3   Abd: Soft, gravid  Ext: non-tender, non-edematous  SSE:   SVE:    Bedside sono:  FHT: 156 bpm. No accelerations. No decelerations.  [place]: No uterine activity. T(C): 37 (12-06-24 @ 19:28), Max: 37.0 (12-06-24 @ 19:20)  HR: 95 (12-06-24 @ 19:28) (95 - 95)  BP: 113/67 (12-06-24 @ 19:28) (113/67 - 113/67)  RR: 16 (12-06-24 @ 19:28) (16 - 16)    Gen: NAD, well-appearing, AAOx3   Abd: Soft, gravid  Ext: non-tender, non-edematous  SSE: physiological discharge, no blood/fluid, cervix appears closed and long  FHT: 155 bpm baseline,   No accelerations. No decelerations.  [place]: No uterine activity. T(C): 37 (12-06-24 @ 19:28), Max: 37.0 (12-06-24 @ 19:20)  HR: 95 (12-06-24 @ 19:28) (95 - 95)  BP: 113/67 (12-06-24 @ 19:28) (113/67 - 113/67)  RR: 16 (12-06-24 @ 19:28) (16 - 16)    Gen: NAD, well-appearing, AAOx3   Abd: Soft, gravid  Lungs: Breathing comfortably on RA  Ext: non-tender, non-edematous  SSE: physiological discharge, no blood/fluid, cervix appears closed and long  FHT: 150 bpm baseline, mod variability, +accels, - decels  [place]: No contractions

## 2024-12-07 LAB
CANDIDA AB TITR SER: SIGNIFICANT CHANGE UP
G VAGINALIS DNA SPEC QL NAA+PROBE: SIGNIFICANT CHANGE UP
T VAGINALIS SPEC QL WET PREP: SIGNIFICANT CHANGE UP

## 2024-12-10 DIAGNOSIS — R05.9 COUGH, UNSPECIFIED: ICD-10-CM

## 2024-12-10 DIAGNOSIS — O26.893 OTHER SPECIFIED PREGNANCY RELATED CONDITIONS, THIRD TRIMESTER: ICD-10-CM

## 2024-12-10 DIAGNOSIS — Z20.822 CONTACT WITH AND (SUSPECTED) EXPOSURE TO COVID-19: ICD-10-CM

## 2024-12-10 DIAGNOSIS — R09.81 NASAL CONGESTION: ICD-10-CM

## 2024-12-10 DIAGNOSIS — Z3A.28 28 WEEKS GESTATION OF PREGNANCY: ICD-10-CM

## 2024-12-10 DIAGNOSIS — R19.7 DIARRHEA, UNSPECIFIED: ICD-10-CM

## 2024-12-12 ENCOUNTER — NON-APPOINTMENT (OUTPATIENT)
Age: 29
End: 2024-12-12

## 2024-12-13 DIAGNOSIS — Z34.93 ENCOUNTER FOR SUPERVISION OF NORMAL PREGNANCY, UNSPECIFIED, THIRD TRIMESTER: ICD-10-CM

## 2024-12-16 ENCOUNTER — ASOB RESULT (OUTPATIENT)
Age: 29
End: 2024-12-16

## 2024-12-16 ENCOUNTER — LABORATORY RESULT (OUTPATIENT)
Age: 29
End: 2024-12-16

## 2024-12-16 ENCOUNTER — APPOINTMENT (OUTPATIENT)
Dept: ANTEPARTUM | Facility: CLINIC | Age: 29
End: 2024-12-16
Payer: COMMERCIAL

## 2024-12-16 ENCOUNTER — TRANSCRIPTION ENCOUNTER (OUTPATIENT)
Age: 29
End: 2024-12-16

## 2024-12-16 PROCEDURE — 76819 FETAL BIOPHYS PROFIL W/O NST: CPT | Mod: 59

## 2024-12-16 PROCEDURE — 76816 OB US FOLLOW-UP PER FETUS: CPT

## 2024-12-17 ENCOUNTER — RESULT REVIEW (OUTPATIENT)
Age: 29
End: 2024-12-17

## 2024-12-17 ENCOUNTER — APPOINTMENT (OUTPATIENT)
Dept: OBGYN | Facility: CLINIC | Age: 29
End: 2024-12-17

## 2024-12-17 ENCOUNTER — OUTPATIENT (OUTPATIENT)
Dept: INPATIENT UNIT | Facility: HOSPITAL | Age: 29
LOS: 1 days | End: 2024-12-17
Payer: COMMERCIAL

## 2024-12-17 VITALS — SYSTOLIC BLOOD PRESSURE: 99 MMHG | DIASTOLIC BLOOD PRESSURE: 60 MMHG | HEART RATE: 71 BPM

## 2024-12-17 VITALS
BODY MASS INDEX: 21.99 KG/M2 | DIASTOLIC BLOOD PRESSURE: 62 MMHG | WEIGHT: 124.13 LBS | HEIGHT: 63 IN | SYSTOLIC BLOOD PRESSURE: 122 MMHG

## 2024-12-17 VITALS
TEMPERATURE: 98 F | DIASTOLIC BLOOD PRESSURE: 63 MMHG | SYSTOLIC BLOOD PRESSURE: 105 MMHG | HEART RATE: 85 BPM | RESPIRATION RATE: 16 BRPM

## 2024-12-17 DIAGNOSIS — F32.A ANXIETY DISORDER, UNSPECIFIED: ICD-10-CM

## 2024-12-17 DIAGNOSIS — E61.1 IRON DEFICIENCY: ICD-10-CM

## 2024-12-17 DIAGNOSIS — Z3A.29 29 WEEKS GESTATION OF PREGNANCY: ICD-10-CM

## 2024-12-17 DIAGNOSIS — Z78.9 OTHER SPECIFIED HEALTH STATUS: ICD-10-CM

## 2024-12-17 DIAGNOSIS — O26.899 OTHER SPECIFIED PREGNANCY RELATED CONDITIONS, UNSPECIFIED TRIMESTER: ICD-10-CM

## 2024-12-17 DIAGNOSIS — F41.9 ANXIETY DISORDER, UNSPECIFIED: ICD-10-CM

## 2024-12-17 DIAGNOSIS — Z82.49 FAMILY HISTORY OF ISCHEMIC HEART DISEASE AND OTHER DISEASES OF THE CIRCULATORY SYSTEM: ICD-10-CM

## 2024-12-17 LAB
ALBUMIN SERPL ELPH-MCNC: 3.5 G/DL — SIGNIFICANT CHANGE UP (ref 3.3–5.2)
ALP SERPL-CCNC: 67 U/L — SIGNIFICANT CHANGE UP (ref 40–120)
ALT FLD-CCNC: 15 U/L — SIGNIFICANT CHANGE UP
AMYLASE P1 CFR SERPL: 169 U/L — HIGH (ref 36–128)
ANION GAP SERPL CALC-SCNC: 13 MMOL/L — SIGNIFICANT CHANGE UP (ref 5–17)
APPEARANCE: CLEAR
AST SERPL-CCNC: 24 U/L — SIGNIFICANT CHANGE UP
BASOPHILS # BLD AUTO: 0.02 K/UL — SIGNIFICANT CHANGE UP (ref 0–0.2)
BASOPHILS NFR BLD AUTO: 0.3 % — SIGNIFICANT CHANGE UP (ref 0–2)
BILIRUB SERPL-MCNC: <0.2 MG/DL — LOW (ref 0.4–2)
BILIRUBIN URINE: ABNORMAL
BLOOD URINE: NEGATIVE
BUN SERPL-MCNC: 5 MG/DL — LOW (ref 8–20)
CALCIUM SERPL-MCNC: 8.8 MG/DL — SIGNIFICANT CHANGE UP (ref 8.4–10.5)
CHLORIDE SERPL-SCNC: 106 MMOL/L — SIGNIFICANT CHANGE UP (ref 96–108)
CO2 SERPL-SCNC: 20 MMOL/L — LOW (ref 22–29)
COLOR: YELLOW
CREAT SERPL-MCNC: 0.36 MG/DL — LOW (ref 0.5–1.3)
EGFR: 143 ML/MIN/1.73M2 — SIGNIFICANT CHANGE UP
EOSINOPHIL # BLD AUTO: 0.2 K/UL — SIGNIFICANT CHANGE UP (ref 0–0.5)
EOSINOPHIL NFR BLD AUTO: 2.6 % — SIGNIFICANT CHANGE UP (ref 0–6)
GLUCOSE QUALITATIVE U: NEGATIVE
GLUCOSE SERPL-MCNC: 69 MG/DL — LOW (ref 70–99)
HCT VFR BLD CALC: 33.2 % — LOW (ref 34.5–45)
HGB BLD-MCNC: 11.3 G/DL — LOW (ref 11.5–15.5)
IMM GRANULOCYTES NFR BLD AUTO: 0.5 % — SIGNIFICANT CHANGE UP (ref 0–0.9)
KETONES URINE: NEGATIVE
LEUKOCYTE ESTERASE URINE: NEGATIVE
LIDOCAIN IGE QN: 51 U/L — SIGNIFICANT CHANGE UP (ref 22–51)
LYMPHOCYTES # BLD AUTO: 1.89 K/UL — SIGNIFICANT CHANGE UP (ref 1–3.3)
LYMPHOCYTES # BLD AUTO: 24.5 % — SIGNIFICANT CHANGE UP (ref 13–44)
MCHC RBC-ENTMCNC: 26.2 PG — LOW (ref 27–34)
MCHC RBC-ENTMCNC: 34 G/DL — SIGNIFICANT CHANGE UP (ref 32–36)
MCV RBC AUTO: 76.9 FL — LOW (ref 80–100)
MONOCYTES # BLD AUTO: 0.72 K/UL — SIGNIFICANT CHANGE UP (ref 0–0.9)
MONOCYTES NFR BLD AUTO: 9.3 % — SIGNIFICANT CHANGE UP (ref 2–14)
NEUTROPHILS # BLD AUTO: 4.86 K/UL — SIGNIFICANT CHANGE UP (ref 1.8–7.4)
NEUTROPHILS NFR BLD AUTO: 62.8 % — SIGNIFICANT CHANGE UP (ref 43–77)
NITRITE URINE: NEGATIVE
PH URINE: 6
PLATELET # BLD AUTO: 244 K/UL — SIGNIFICANT CHANGE UP (ref 150–400)
POTASSIUM SERPL-MCNC: 4 MMOL/L — SIGNIFICANT CHANGE UP (ref 3.5–5.3)
POTASSIUM SERPL-SCNC: 4 MMOL/L — SIGNIFICANT CHANGE UP (ref 3.5–5.3)
PROT SERPL-MCNC: 6.9 G/DL — SIGNIFICANT CHANGE UP (ref 6.6–8.7)
PROTEIN URINE: NEGATIVE
RBC # BLD: 4.32 M/UL — SIGNIFICANT CHANGE UP (ref 3.8–5.2)
RBC # FLD: 14.2 % — SIGNIFICANT CHANGE UP (ref 10.3–14.5)
SODIUM SERPL-SCNC: 139 MMOL/L — SIGNIFICANT CHANGE UP (ref 135–145)
SPECIFIC GRAVITY URINE: 1.02
UROBILINOGEN URINE: 0.2 (ref 0.2–?)
WBC # BLD: 7.73 K/UL — SIGNIFICANT CHANGE UP (ref 3.8–10.5)
WBC # FLD AUTO: 7.73 K/UL — SIGNIFICANT CHANGE UP (ref 3.8–10.5)

## 2024-12-17 PROCEDURE — 80053 COMPREHEN METABOLIC PANEL: CPT

## 2024-12-17 PROCEDURE — 59025 FETAL NON-STRESS TEST: CPT

## 2024-12-17 PROCEDURE — 83690 ASSAY OF LIPASE: CPT

## 2024-12-17 PROCEDURE — 82150 ASSAY OF AMYLASE: CPT

## 2024-12-17 PROCEDURE — G0463: CPT

## 2024-12-17 PROCEDURE — 85025 COMPLETE CBC W/AUTO DIFF WBC: CPT

## 2024-12-17 PROCEDURE — 36415 COLL VENOUS BLD VENIPUNCTURE: CPT

## 2024-12-17 PROCEDURE — 76705 ECHO EXAM OF ABDOMEN: CPT | Mod: 26

## 2024-12-17 PROCEDURE — 99283 EMERGENCY DEPT VISIT LOW MDM: CPT

## 2024-12-17 PROCEDURE — 76705 ECHO EXAM OF ABDOMEN: CPT

## 2024-12-17 PROCEDURE — 0501F PRENATAL FLOW SHEET: CPT

## 2024-12-17 RX ORDER — ACETAMINOPHEN 500MG 500 MG/1
975 TABLET, COATED ORAL ONCE
Refills: 0 | Status: COMPLETED | OUTPATIENT
Start: 2024-12-17 | End: 2024-12-17

## 2024-12-17 RX ADMIN — ACETAMINOPHEN 500MG 975 MILLIGRAM(S): 500 TABLET, COATED ORAL at 20:49

## 2024-12-17 RX ADMIN — ACETAMINOPHEN 500MG 975 MILLIGRAM(S): 500 TABLET, COATED ORAL at 21:19

## 2024-12-17 NOTE — OB RN TRIAGE NOTE - FALL HARM RISK - UNIVERSAL INTERVENTIONS
Bed in lowest position, wheels locked, appropriate side rails in place/Call bell, personal items and telephone in reach/Instruct patient to call for assistance before getting out of bed or chair/Non-slip footwear when patient is out of bed/[place] to call system/Physically safe environment - no spills, clutter or unnecessary equipment/Purposeful Proactive Rounding/Room/bathroom lighting operational, light cord in reach

## 2024-12-17 NOTE — OB PROVIDER TRIAGE NOTE - HISTORY OF PRESENT ILLNESS
JERMAINE RODRIGUEZ is a 29y  at 29w6d GA who presents to L&D for worsening RUQ pain. The pain is sharp, constant, and worse after eating. Symptoms began 3 weeks ago, was evaluated at L&D with negative RUQ US at that time. Over the last 2 days, her pain has worsened, and she was seen in the office today then sent to L&D.    Pt denies vaginal bleeding, contractions and leakage of fluid. She endorses good fetal movement.  Pt denies trauma.    Pt denies headaches, visual disturbances, epigastric pain and new-onset edema.     She denies any urinary complaints.     She denies fevers, chills, nausea, vomiting.     She denies shortness of breath, chest pain, and palpitations.    Pregnancy course is significant for:   -ARTIE diagnosed at 20 weeks  -First trimester HG    POB:   PGYN: Abnormal Paps in  and  - also HPV+ in , had negative colpo. Most recent pap was normal. -fibroids/-cysts, denied STD hx  PMH: none  PSH: wisdom teeth extraction  SH: Denies tobacco use, EtOH use and illicit drug use during the pregnancy; Feels safe at home  Meds: Prenatal vitamin, PO iron, Vitamin D  All: none

## 2024-12-17 NOTE — OB PROVIDER TRIAGE NOTE - NSHPPHYSICALEXAM_GEN_ALL_CORE
T(C): 36.4 (12-17-24 @ 20:01), Max: 36.4 (12-17-24 @ 20:01)  HR: 85 (12-17-24 @ 20:06) (85 - 85)  BP: 105/63 (12-17-24 @ 20:06) (105/63 - 105/63)  RR: 16 (12-17-24 @ 20:01) (16 - 16)  SpO2: --  Gen: NAD, well-appearing  Pulm: Breathing comfortably on room air  Abd: +RUQ tenderness, +Rodríguez's sign; soft, gravid  Ext: non-edematous, non-tender     FHT: Baseline 140, moderate variability, +accelerations, -decelerations  [place]: no contractions

## 2024-12-17 NOTE — OB PROVIDER TRIAGE NOTE - NSOBPROVIDERNOTE_OBGYN_ALL_OB_FT
A/P: 29y  at 29w6d GA who presents to L&D for RUQ pain for the last 3 weeks, worse in the last 2 days, worse after eating. RUQ tenderness and positive Rodríguez's sign on exam.    -RUQ ultrasound  -CBC, CMP, lipase    Fetus: Reactive NST  [place]: No contractions  Dispo: Continue to observe. Pending labs/imaging A/P: 29y  at 29w6d GA who presents to L&D for RUQ pain for the last 3 weeks, worse in the last 2 days, worse after eating. RUQ tenderness and positive Rodríguez's sign on exam.    -RUQ ultrasound  -CBC, CMP, lipase  -Tylenol for pain    Fetus: Reactive NST  [place]: No contractions  Dispo: Continue to observe. Pending labs/imaging A/P: 29y  at 29w6d GA who presents to L&D for RUQ pain for the last 3 weeks, worse in the last 2 days, worse after eating. RUQ tenderness and positive Rodríguez's sign on exam.    -RUQ ultrasound  -CBC, CMP, lipase  -Tylenol for pain    Fetus: Reactive NST  [place]: No contractions  Dispo: Continue to observe. Pending labs/imaging    Update 2300:  Pain improved significantly with Tylenol  Labs wnl  RUQ sono no signs of gallbladder pathology  Patient recommended to take tylenol prn for pain. Given return precautions for if pain worsens or if having inability to tolerate PO.  PTL precautions reviewed  Scheduled for follow up with Dr. Graf  Cleared for discharge to home by Dr. Kaur

## 2024-12-17 NOTE — OB PROVIDER TRIAGE NOTE - ATTENDING COMMENTS
29y  at 29w6d GA who presents to L&D for worsening RUQ pain. The pain is sharp, constant, and worse after eating. Symptoms began 3 weeks ago, was evaluated at L&D with negative RUQ US at that time. Over the last 2 days, her pain has worsened, and she was seen in the office today then sent to L&D.    Pt denies vaginal bleeding, contractions and leakage of fluid. She endorses good fetal movement.  Pt denies trauma.    Gen: NAD, well-appearing  Pulm: Breathing comfortably on room air  Abd: +RUQ tenderness, +Rodríguez's sign; soft, gravid  Ext: non-edematous, non-tender     FHT: Baseline 140, moderate variability, +accelerations, -decelerations  [place]: no contractions    29y  at 29w6d GA who presents to L&D for RUQ pain for the last 3 weeks, worse in the last 2 days, worse after eating. RUQ tenderness and positive Rodríguez's sign on exam.    -RUQ ultrasound  -CBC, CMP, lipase  -Tylenol for pain    Fetus: Reactive NST  [place]: No contractions  Dispo: Continue to observe. Pending labs/imaging    Update 2300:  Pain improved significantly with Tylenol  Labs wnl  RUQ sono no signs of gallbladder pathology  Patient recommended to take tylenol prn for pain. Given return precautions for if pain worsens or if having inability to tolerate PO.  PTL precautions reviewed  Scheduled for follow up with Dr. Graf  Cleared for discharge to home

## 2024-12-18 LAB
APPEARANCE: ABNORMAL
BILIRUBIN URINE: NEGATIVE
BLOOD URINE: NEGATIVE
COLOR: YELLOW
GLUCOSE QUALITATIVE U: NEGATIVE MG/DL
HCT VFR BLD CALC: 30.7 %
HGB BLD-MCNC: 10.4 G/DL
KETONES URINE: NEGATIVE MG/DL
LEUKOCYTE ESTERASE URINE: NEGATIVE
MCHC RBC-ENTMCNC: 26.1 PG
MCHC RBC-ENTMCNC: 33.9 G/DL
MCV RBC AUTO: 76.9 FL
NITRITE URINE: NEGATIVE
PH URINE: 5.5
PLATELET # BLD AUTO: 270 K/UL
PROTEIN URINE: NEGATIVE MG/DL
RBC # BLD: 3.99 M/UL
RBC # FLD: 14.3 %
SPECIFIC GRAVITY URINE: 1.02
UROBILINOGEN URINE: 0.2 MG/DL
WBC # FLD AUTO: 8.56 K/UL

## 2024-12-19 ENCOUNTER — TRANSCRIPTION ENCOUNTER (OUTPATIENT)
Age: 29
End: 2024-12-19

## 2024-12-19 DIAGNOSIS — R10.11 RIGHT UPPER QUADRANT PAIN: ICD-10-CM

## 2024-12-19 DIAGNOSIS — O26.893 OTHER SPECIFIED PREGNANCY RELATED CONDITIONS, THIRD TRIMESTER: ICD-10-CM

## 2024-12-19 DIAGNOSIS — Z3A.29 29 WEEKS GESTATION OF PREGNANCY: ICD-10-CM

## 2024-12-19 DIAGNOSIS — R73.09 OTHER ABNORMAL GLUCOSE: ICD-10-CM

## 2024-12-19 DIAGNOSIS — D50.9 IRON DEFICIENCY ANEMIA, UNSPECIFIED: ICD-10-CM

## 2024-12-19 DIAGNOSIS — O99.013 ANEMIA COMPLICATING PREGNANCY, THIRD TRIMESTER: ICD-10-CM

## 2024-12-19 LAB
ALBUMIN SERPL ELPH-MCNC: 3.6 G/DL
ALP BLD-CCNC: 72 U/L
ALT SERPL-CCNC: 10 U/L
ANION GAP SERPL CALC-SCNC: 16 MMOL/L
AST SERPL-CCNC: 18 U/L
BILIRUB SERPL-MCNC: 0.2 MG/DL
BUN SERPL-MCNC: 7 MG/DL
CALCIUM SERPL-MCNC: 9.1 MG/DL
CHLORIDE SERPL-SCNC: 102 MMOL/L
CO2 SERPL-SCNC: 18 MMOL/L
CREAT SERPL-MCNC: 0.45 MG/DL
EGFR: 135 ML/MIN/1.73M2
FERRITIN SERPL-MCNC: 15 NG/ML
GLUCOSE 1H P 50 G GLC PO SERPL-MCNC: 137 MG/DL
GLUCOSE SERPL-MCNC: 104 MG/DL
LEAD BLD-MCNC: <1 UG/DL
POTASSIUM SERPL-SCNC: 3.9 MMOL/L
PROT SERPL-MCNC: 6.2 G/DL
SODIUM SERPL-SCNC: 136 MMOL/L
T PALLIDUM AB SER QL IA: NEGATIVE

## 2024-12-20 LAB
B19V IGG SER QL IA: 0.17 INDEX
B19V IGG+IGM SER-IMP: NEGATIVE
B19V IGG+IGM SER-IMP: NORMAL
B19V IGM FLD-ACNC: 0.19 INDEX
B19V IGM SER-ACNC: NEGATIVE
HGB A MFR BLD: 61 %
HGB A2 MFR BLD: 3.4 %
HGB C MFR BLD: 35.6 %
HGB FRACT BLD-IMP: NORMAL
HGB S BLD QL SOLY: NEGATIVE

## 2024-12-23 ENCOUNTER — NON-APPOINTMENT (OUTPATIENT)
Age: 29
End: 2024-12-23

## 2024-12-24 ENCOUNTER — APPOINTMENT (OUTPATIENT)
Dept: OBGYN | Facility: CLINIC | Age: 29
End: 2024-12-24
Payer: COMMERCIAL

## 2024-12-24 VITALS
DIASTOLIC BLOOD PRESSURE: 60 MMHG | SYSTOLIC BLOOD PRESSURE: 122 MMHG | BODY MASS INDEX: 21.03 KG/M2 | HEIGHT: 64 IN | WEIGHT: 123.19 LBS

## 2024-12-24 DIAGNOSIS — Z3A.30 30 WEEKS GESTATION OF PREGNANCY: ICD-10-CM

## 2024-12-24 PROBLEM — Z86.2 PERSONAL HISTORY OF DISEASES OF THE BLOOD AND BLOOD-FORMING ORGANS AND CERTAIN DISORDERS INVOLVING THE IMMUNE MECHANISM: Chronic | Status: ACTIVE | Noted: 2024-12-17

## 2024-12-24 PROCEDURE — 0502F SUBSEQUENT PRENATAL CARE: CPT

## 2024-12-26 LAB
APPEARANCE: CLEAR
BILIRUBIN URINE: NEGATIVE
BLOOD URINE: NEGATIVE
COLOR: YELLOW
GLUCOSE QUALITATIVE U: NEGATIVE
KETONES URINE: NEGATIVE
LEUKOCYTE ESTERASE URINE: NEGATIVE
NITRITE URINE: NEGATIVE
PH URINE: 6.5
PROTEIN URINE: NEGATIVE
SPECIFIC GRAVITY URINE: 1.01
UROBILINOGEN URINE: 0.2 (ref 0.2–?)

## 2024-12-27 ENCOUNTER — APPOINTMENT (OUTPATIENT)
Dept: OBGYN | Facility: CLINIC | Age: 29
End: 2024-12-27
Payer: COMMERCIAL

## 2024-12-27 PROCEDURE — 36415 COLL VENOUS BLD VENIPUNCTURE: CPT

## 2024-12-30 ENCOUNTER — NON-APPOINTMENT (OUTPATIENT)
Age: 29
End: 2024-12-30

## 2024-12-30 ENCOUNTER — APPOINTMENT (OUTPATIENT)
Dept: OBGYN | Facility: CLINIC | Age: 29
End: 2024-12-30

## 2024-12-30 ENCOUNTER — APPOINTMENT (OUTPATIENT)
Dept: OBGYN | Facility: CLINIC | Age: 29
End: 2024-12-30
Payer: COMMERCIAL

## 2024-12-30 VITALS
DIASTOLIC BLOOD PRESSURE: 60 MMHG | HEIGHT: 64 IN | WEIGHT: 123 LBS | BODY MASS INDEX: 21 KG/M2 | SYSTOLIC BLOOD PRESSURE: 98 MMHG

## 2024-12-30 LAB
APPEARANCE: CLEAR
BILIRUBIN URINE: NEGATIVE
BLOOD URINE: NEGATIVE
COLOR: YELLOW
GLUCOSE QUALITATIVE U: NEGATIVE
KETONES URINE: NEGATIVE
LEUKOCYTE ESTERASE URINE: NEGATIVE
NITRITE URINE: NEGATIVE
PH URINE: 6.5
PROTEIN URINE: NEGATIVE
SPECIFIC GRAVITY URINE: <=1.005
UROBILINOGEN URINE: 0.2 (ref 0.2–?)

## 2024-12-30 PROCEDURE — 0502F SUBSEQUENT PRENATAL CARE: CPT

## 2024-12-30 PROCEDURE — 59025 FETAL NON-STRESS TEST: CPT

## 2025-01-03 LAB
24R-OH-CALCIDIOL SERPL-MCNC: 115 PG/ML
CALCIUM SERPL-MCNC: 9.2 MG/DL
ESTIMATED AVERAGE GLUCOSE: 97 MG/DL
GLUCOSE BS SERPL-MCNC: 69 MG/DL
HBA1C MFR BLD HPLC: 5 %

## 2025-01-06 ENCOUNTER — OUTPATIENT (OUTPATIENT)
Dept: OUTPATIENT SERVICES | Facility: HOSPITAL | Age: 30
LOS: 1 days | Discharge: HOME | End: 2025-01-06

## 2025-01-06 ENCOUNTER — NON-APPOINTMENT (OUTPATIENT)
Age: 30
End: 2025-01-06

## 2025-01-06 DIAGNOSIS — R79.9 ABNORMAL FINDING OF BLOOD CHEMISTRY, UNSPECIFIED: ICD-10-CM

## 2025-01-07 ENCOUNTER — APPOINTMENT (OUTPATIENT)
Dept: OBGYN | Facility: CLINIC | Age: 30
End: 2025-01-07
Payer: COMMERCIAL

## 2025-01-07 ENCOUNTER — NON-APPOINTMENT (OUTPATIENT)
Age: 30
End: 2025-01-07

## 2025-01-07 ENCOUNTER — APPOINTMENT (OUTPATIENT)
Dept: HEMATOLOGY ONCOLOGY | Facility: CLINIC | Age: 30
End: 2025-01-07
Payer: COMMERCIAL

## 2025-01-07 VITALS
HEART RATE: 89 BPM | SYSTOLIC BLOOD PRESSURE: 107 MMHG | OXYGEN SATURATION: 95 % | WEIGHT: 127.09 LBS | TEMPERATURE: 97.6 F | DIASTOLIC BLOOD PRESSURE: 74 MMHG | BODY MASS INDEX: 21.7 KG/M2 | HEIGHT: 64 IN

## 2025-01-07 VITALS
DIASTOLIC BLOOD PRESSURE: 70 MMHG | HEIGHT: 64 IN | BODY MASS INDEX: 21.61 KG/M2 | SYSTOLIC BLOOD PRESSURE: 110 MMHG | WEIGHT: 126.56 LBS

## 2025-01-07 DIAGNOSIS — E61.1 IRON DEFICIENCY: ICD-10-CM

## 2025-01-07 DIAGNOSIS — D58.2 OTHER HEMOGLOBINOPATHIES: ICD-10-CM

## 2025-01-07 PROCEDURE — 99205 OFFICE O/P NEW HI 60 MIN: CPT

## 2025-01-07 PROCEDURE — 0502F SUBSEQUENT PRENATAL CARE: CPT

## 2025-01-08 LAB
ALBUMIN SERPL ELPH-MCNC: 3.7 G/DL
ALP BLD-CCNC: 73 U/L
ALT SERPL-CCNC: 10 U/L
ANION GAP SERPL CALC-SCNC: 14 MMOL/L
APPEARANCE: CLEAR
AST SERPL-CCNC: 17 U/L
BASOPHILS # BLD AUTO: 0.03 K/UL
BASOPHILS NFR BLD AUTO: 0.3 %
BILIRUB SERPL-MCNC: 0.2 MG/DL
BILIRUBIN URINE: NEGATIVE
BLOOD URINE: NEGATIVE
BUN SERPL-MCNC: 5 MG/DL
CALCIUM SERPL-MCNC: 9.4 MG/DL
CHLORIDE SERPL-SCNC: 102 MMOL/L
CO2 SERPL-SCNC: 18 MMOL/L
COLOR: YELLOW
CREAT SERPL-MCNC: 0.52 MG/DL
EGFR: 131 ML/MIN/1.73M2
EOSINOPHIL # BLD AUTO: 0.21 K/UL
EOSINOPHIL NFR BLD AUTO: 2.3 %
FERRITIN SERPL-MCNC: 16 NG/ML
FOLATE SERPL-MCNC: 16.3 NG/ML
GLUCOSE QUALITATIVE U: NEGATIVE
GLUCOSE SERPL-MCNC: 110 MG/DL
HCT VFR BLD CALC: 32.3 %
HGB BLD-MCNC: 11.1 G/DL
IMM GRANULOCYTES NFR BLD AUTO: 0.5 %
IRON SATN MFR SERPL: 9 %
IRON SERPL-MCNC: 41 UG/DL
KETONES URINE: NEGATIVE
LDH SERPL-CCNC: 243 U/L
LEUKOCYTE ESTERASE URINE: NEGATIVE
LYMPHOCYTES # BLD AUTO: 1.64 K/UL
LYMPHOCYTES NFR BLD AUTO: 18 %
MAN DIFF?: NORMAL
MCHC RBC-ENTMCNC: 26.2 PG
MCHC RBC-ENTMCNC: 34.4 G/DL
MCV RBC AUTO: 76.2 FL
MONOCYTES # BLD AUTO: 0.67 K/UL
MONOCYTES NFR BLD AUTO: 7.4 %
NEUTROPHILS # BLD AUTO: 6.51 K/UL
NEUTROPHILS NFR BLD AUTO: 71.5 %
NITRITE URINE: NEGATIVE
PH URINE: 6
PLATELET # BLD AUTO: 201 K/UL
POTASSIUM SERPL-SCNC: 3.9 MMOL/L
PROT SERPL-MCNC: 6.4 G/DL
PROTEIN URINE: NEGATIVE
RBC # BLD: 4.24 M/UL
RBC # BLD: 4.24 M/UL
RBC # FLD: 15.6 %
RETICS # AUTO: 2.2 %
RETICS AGGREG/RBC NFR: 93.7 K/UL
SODIUM SERPL-SCNC: 135 MMOL/L
SPECIFIC GRAVITY URINE: 1.01
TIBC SERPL-MCNC: 465 UG/DL
TSH SERPL-ACNC: 2.87 UIU/ML
UIBC SERPL-MCNC: 424 UG/DL
UROBILINOGEN URINE: 0.2 (ref 0.2–?)
VIT B12 SERPL-MCNC: 413 PG/ML
WBC # FLD AUTO: 9.11 K/UL

## 2025-01-10 DIAGNOSIS — Z3A.32 32 WEEKS GESTATION OF PREGNANCY: ICD-10-CM

## 2025-01-10 DIAGNOSIS — Z3A.30 30 WEEKS GESTATION OF PREGNANCY: ICD-10-CM

## 2025-01-10 DIAGNOSIS — Z3A.29 29 WEEKS GESTATION OF PREGNANCY: ICD-10-CM

## 2025-01-15 ENCOUNTER — NON-APPOINTMENT (OUTPATIENT)
Age: 30
End: 2025-01-15

## 2025-01-23 ENCOUNTER — APPOINTMENT (OUTPATIENT)
Dept: OBGYN | Facility: CLINIC | Age: 30
End: 2025-01-23
Payer: COMMERCIAL

## 2025-01-23 VITALS
BODY MASS INDEX: 22.61 KG/M2 | HEIGHT: 64 IN | DIASTOLIC BLOOD PRESSURE: 72 MMHG | SYSTOLIC BLOOD PRESSURE: 122 MMHG | WEIGHT: 132.44 LBS

## 2025-01-23 PROBLEM — Z3A.35 35 WEEKS GESTATION OF PREGNANCY: Status: ACTIVE | Noted: 2025-01-23

## 2025-01-23 LAB
APPEARANCE: CLEAR
BILIRUBIN URINE: NEGATIVE
BLOOD URINE: ABNORMAL
COLOR: YELLOW
GLUCOSE QUALITATIVE U: NEGATIVE
KETONES URINE: NEGATIVE
LEUKOCYTE ESTERASE URINE: NEGATIVE
NITRITE URINE: NEGATIVE
PH URINE: 6
PROTEIN URINE: NEGATIVE
SPECIFIC GRAVITY URINE: 1.01
UROBILINOGEN URINE: 0.2 (ref 0.2–?)

## 2025-01-23 PROCEDURE — 0502F SUBSEQUENT PRENATAL CARE: CPT

## 2025-01-24 LAB — HIV1+2 AB SPEC QL IA.RAPID: NONREACTIVE

## 2025-01-28 ENCOUNTER — NON-APPOINTMENT (OUTPATIENT)
Age: 30
End: 2025-01-28

## 2025-01-28 ENCOUNTER — APPOINTMENT (OUTPATIENT)
Dept: OBGYN | Facility: CLINIC | Age: 30
End: 2025-01-28
Payer: COMMERCIAL

## 2025-01-28 ENCOUNTER — OUTPATIENT (OUTPATIENT)
Dept: INPATIENT UNIT | Facility: HOSPITAL | Age: 30
LOS: 1 days | End: 2025-01-28
Payer: COMMERCIAL

## 2025-01-28 VITALS — SYSTOLIC BLOOD PRESSURE: 107 MMHG | HEART RATE: 96 BPM | DIASTOLIC BLOOD PRESSURE: 66 MMHG

## 2025-01-28 VITALS
WEIGHT: 135.6 LBS | HEIGHT: 64 IN | DIASTOLIC BLOOD PRESSURE: 60 MMHG | BODY MASS INDEX: 23.15 KG/M2 | SYSTOLIC BLOOD PRESSURE: 90 MMHG

## 2025-01-28 DIAGNOSIS — O26.899 OTHER SPECIFIED PREGNANCY RELATED CONDITIONS, UNSPECIFIED TRIMESTER: ICD-10-CM

## 2025-01-28 DIAGNOSIS — Z3A.35 35 WEEKS GESTATION OF PREGNANCY: ICD-10-CM

## 2025-01-28 LAB
APPEARANCE UR: CLEAR — SIGNIFICANT CHANGE UP
APPEARANCE: CLEAR
BILIRUB UR-MCNC: NEGATIVE — SIGNIFICANT CHANGE UP
BILIRUBIN URINE: NEGATIVE
BLOOD URINE: ABNORMAL
COLOR SPEC: YELLOW — SIGNIFICANT CHANGE UP
COLOR: YELLOW
DIFF PNL FLD: NEGATIVE — SIGNIFICANT CHANGE UP
GLUCOSE QUALITATIVE U: NEGATIVE
GLUCOSE UR QL: NEGATIVE MG/DL — SIGNIFICANT CHANGE UP
KETONES UR-MCNC: NEGATIVE MG/DL — SIGNIFICANT CHANGE UP
KETONES URINE: NEGATIVE
LEUKOCYTE ESTERASE UR-ACNC: NEGATIVE — SIGNIFICANT CHANGE UP
LEUKOCYTE ESTERASE URINE: NEGATIVE
NITRITE UR-MCNC: NEGATIVE — SIGNIFICANT CHANGE UP
NITRITE URINE: NEGATIVE
PH UR: 6.5 — SIGNIFICANT CHANGE UP (ref 5–8)
PH URINE: 6
PROT UR-MCNC: NEGATIVE MG/DL — SIGNIFICANT CHANGE UP
PROTEIN URINE: NEGATIVE
SP GR SPEC: 1.01 — SIGNIFICANT CHANGE UP (ref 1–1.03)
SPECIFIC GRAVITY URINE: 1.02
UROBILINOGEN FLD QL: 0.2 MG/DL — SIGNIFICANT CHANGE UP (ref 0.2–1)
UROBILINOGEN URINE: 0.2 (ref 0.2–?)

## 2025-01-28 PROCEDURE — 96360 HYDRATION IV INFUSION INIT: CPT

## 2025-01-28 PROCEDURE — 87491 CHLMYD TRACH DNA AMP PROBE: CPT

## 2025-01-28 PROCEDURE — 81003 URINALYSIS AUTO W/O SCOPE: CPT

## 2025-01-28 PROCEDURE — G0463: CPT

## 2025-01-28 PROCEDURE — 59025 FETAL NON-STRESS TEST: CPT

## 2025-01-28 PROCEDURE — 87800 DETECT AGNT MULT DNA DIREC: CPT

## 2025-01-28 PROCEDURE — 87591 N.GONORRHOEAE DNA AMP PROB: CPT

## 2025-01-28 PROCEDURE — 99283 EMERGENCY DEPT VISIT LOW MDM: CPT

## 2025-01-28 PROCEDURE — 0502F SUBSEQUENT PRENATAL CARE: CPT

## 2025-01-28 RX ORDER — SODIUM CHLORIDE 9 G/ML
1000 INJECTION, SOLUTION INTRAVENOUS ONCE
Refills: 0 | Status: COMPLETED | OUTPATIENT
Start: 2025-01-28 | End: 2025-01-28

## 2025-01-28 RX ADMIN — SODIUM CHLORIDE 1000 MILLILITER(S): 9 INJECTION, SOLUTION INTRAVENOUS at 22:13

## 2025-01-28 NOTE — OB PROVIDER TRIAGE NOTE - NSOBPROVIDERNOTE_OBGYN_ALL_OB_FT
JERMAINE RODRIGUEZ is a 29y  at 35w6d weeks GA who presents to L&D with contractions, now resolved.    A/P:   -VSS  -PAtient reports contractions resolved after IVF hydration.  -SVE 0/0/-3 on two sperate exams.   -[place] initially with contractions every 2-3 min. [place] now with uterine irritability  -UA negative.   labor unlikely at this time.  -Patient cleared for discharge.   -To follow up with OB provider next week at scheduled appointment.  -Strict return precautions given.    Dispo: Discharge to home    Discussed with Dr. Kaur

## 2025-01-28 NOTE — OB PROVIDER TRIAGE NOTE - HISTORY OF PRESENT ILLNESS
JERMAINE RODRIGUEZ is a 29y  at 35w6d weeks GA who presents to L&D with painful contractions that started earlier this morning and became more severe and frequent. Patient had a routine appointment with her OB provider today;     Pt denies vaginal bleeding, contractions and leakage of fluid. She endorses good fetal movement.     Pt denies trauma. Her last cervical exam was . Last sexual intercourse was .     Pt denies headaches, visual disturbances, RUQ pain, epigastric pain and new-onset edema.     She denies any urinary complaints.     She denies fevers, chills, nausea, vomiting.     She denies shortness of breath, chest pain, and palpitations.    Pregnancy course is  uncomplicated.   Pregnancy course is significant for:    POB:  PGYN: -fibroids/-cysts, denied STD hx, denies abnormal PAPs  PMH:  PSH:  SH: Denies tobacco use, EtOH use and illicit drug use during the pregnancy; Feels safe at home  Meds:  All:    T(C): 36.8 (25 @ 20:48), Max: 36.8 (25 @ 20:45)  HR: 96 (25 @ 20:45) (96 - 96)  BP: 107/66 (25 @ 20:45) (107/66 - 107/66)  RR: 16 (25 @ 20:45) (16 - 16)  SpO2: --  Gen: NAD, well-appearing  Heart: S1 S2, RRR  Lungs: CTAB  Abd: soft, gravid  Ext: non-edematous, non-tender   SVE:   SSE: cervix visualized, closed and without any signs of bleeding or drainage, no pooling   FHT:   [place]:    A/P:     Fetus:  [place]:  Dispo: Continue to observe.     Discussed with    JERMAINE RODRIGUEZ is a 29y  at 35w6d weeks GA who presents to L&D with painful contractions that started earlier this morning and became more severe and frequent. Patient had a routine appointment with her OB provider today and was found to be moo on toco. Pt denies vaginal bleeding and leakage of fluid. She endorses good fetal movement. She denies any urinary complaints. She denies fevers, chills, nausea, vomiting.     Pregnancy course is significant for:   -ARTIE diagnosed at 20 weeks  -First trimester HG    POB:   PGYN: Abnormal Paps in 2018 and  - also HPV+ in , had negative colpo. Most recent pap was normal. -fibroids/-cysts, denied STD hx  PMH: none  PSH: wisdom teeth extraction  SH: Denies tobacco use, EtOH use and illicit drug use during the pregnancy; Feels safe at home  Meds: Prenatal vitamin, PO iron, Vitamin D  All: none

## 2025-01-28 NOTE — OB PROVIDER TRIAGE NOTE - ATTENDING COMMENTS
29y  at 35w6d weeks GA who presents to L&D with painful contractions that started earlier this morning and became more severe and frequent. Patient had a routine appointment with her OB provider today and was found to be moo on toco. Pt denies vaginal bleeding and leakage of fluid. She endorses good fetal movement. She denies any urinary complaints. She denies fevers, chills, nausea, vomiting.    SVE: 0/0/-3 x2  SSE: cervix visualized, closed and without any signs of bleeding or drainage, no pooling   FHT: baseline FHR 145s, moderate variability, +accels, -decels  [place]: contractions every 2-3 min > IVF hydration > uterine irritability    29y  at 35w6d weeks GA who presents to L&D with contractions, now resolved.    A/P:   -VSS  -PAtient reports contractions resolved after IVF hydration.  -SVE 0/0/-3 on two sperate exams.   -[place] initially with contractions every 2-3 min. [place] now with uterine irritability  -UA negative.   labor unlikely at this time.  -Patient cleared for discharge.   -To follow up with OB provider next week at scheduled appointment.  -Strict return precautions given.

## 2025-01-28 NOTE — OB PROVIDER TRIAGE NOTE - NSHPPHYSICALEXAM_GEN_ALL_CORE
T(C): 36.8 (01-28-25 @ 20:48), Max: 36.8 (01-28-25 @ 20:45)  HR: 96 (01-28-25 @ 20:45) (96 - 96)  BP: 107/66 (01-28-25 @ 20:45) (107/66 - 107/66)  RR: 16 (01-28-25 @ 20:45) (16 - 16)    Gen: NAD, well-appearing  Abd: soft, gravid  Ext: non-edematous, non-tender   SVE: 0/0/-3 x2  SSE: cervix visualized, closed and without any signs of bleeding or drainage, no pooling   FHT: baseline FHR 145s, moderate variability, +accels, -decels  [place]: contractions every 2-3 min > IVF hydration > uterine irritability

## 2025-01-29 ENCOUNTER — NON-APPOINTMENT (OUTPATIENT)
Age: 30
End: 2025-01-29

## 2025-01-29 VITALS
DIASTOLIC BLOOD PRESSURE: 66 MMHG | SYSTOLIC BLOOD PRESSURE: 108 MMHG | RESPIRATION RATE: 16 BRPM | HEART RATE: 81 BPM | TEMPERATURE: 98 F

## 2025-01-29 LAB
C TRACH RRNA SPEC QL NAA+PROBE: SIGNIFICANT CHANGE UP
CANDIDA AB TITR SER: SIGNIFICANT CHANGE UP
G VAGINALIS DNA SPEC QL NAA+PROBE: SIGNIFICANT CHANGE UP
HCT VFR BLD CALC: 32.7 %
HGB BLD-MCNC: 10.9 G/DL
HIV1+2 AB SPEC QL IA.RAPID: NONREACTIVE
MCHC RBC-ENTMCNC: 26.3 PG
MCHC RBC-ENTMCNC: 33.3 G/DL
MCV RBC AUTO: 78.8 FL
N GONORRHOEA RRNA SPEC QL NAA+PROBE: SIGNIFICANT CHANGE UP
PLATELET # BLD AUTO: 215 K/UL
RBC # BLD: 4.15 M/UL
RBC # FLD: 16.4 %
T PALLIDUM AB SER QL IA: NEGATIVE
T VAGINALIS SPEC QL WET PREP: SIGNIFICANT CHANGE UP
WBC # FLD AUTO: 9.57 K/UL

## 2025-01-30 DIAGNOSIS — O47.03 FALSE LABOR BEFORE 37 COMPLETED WEEKS OF GESTATION, THIRD TRIMESTER: ICD-10-CM

## 2025-01-30 DIAGNOSIS — D50.9 IRON DEFICIENCY ANEMIA, UNSPECIFIED: ICD-10-CM

## 2025-01-30 DIAGNOSIS — O21.2 LATE VOMITING OF PREGNANCY: ICD-10-CM

## 2025-01-30 DIAGNOSIS — O99.013 ANEMIA COMPLICATING PREGNANCY, THIRD TRIMESTER: ICD-10-CM

## 2025-01-30 DIAGNOSIS — O24.410 GESTATIONAL DIABETES MELLITUS IN PREGNANCY, DIET CONTROLLED: ICD-10-CM

## 2025-01-30 DIAGNOSIS — Z3A.35 35 WEEKS GESTATION OF PREGNANCY: ICD-10-CM

## 2025-01-30 LAB
GP B STREP DNA SPEC QL NAA+PROBE: NOT DETECTED
HCV AB SER QL: NONREACTIVE
HCV S/CO RATIO: 0.1 S/CO
SOURCE GBS: NORMAL

## 2025-01-31 LAB
HCV AB SER QL: NORMAL
HCV S/CO RATIO: NORMAL S/CO
HIV1+2 AB SPEC QL IA.RAPID: NORMAL
T PALLIDUM AB SER QL IA: NORMAL

## 2025-02-03 ENCOUNTER — APPOINTMENT (OUTPATIENT)
Dept: OBGYN | Facility: CLINIC | Age: 30
End: 2025-02-03
Payer: COMMERCIAL

## 2025-02-03 ENCOUNTER — APPOINTMENT (OUTPATIENT)
Dept: HEMATOLOGY ONCOLOGY | Facility: CLINIC | Age: 30
End: 2025-02-03

## 2025-02-03 VITALS
SYSTOLIC BLOOD PRESSURE: 106 MMHG | BODY MASS INDEX: 22.71 KG/M2 | WEIGHT: 133 LBS | DIASTOLIC BLOOD PRESSURE: 66 MMHG | HEIGHT: 64 IN

## 2025-02-03 PROBLEM — Z3A.36 36 WEEKS GESTATION OF PREGNANCY: Status: ACTIVE | Noted: 2025-02-03

## 2025-02-03 LAB
APPEARANCE: CLEAR
BILIRUBIN URINE: NEGATIVE
BLOOD URINE: NEGATIVE
COLOR: YELLOW
GLUCOSE QUALITATIVE U: NEGATIVE
KETONES URINE: NEGATIVE
LEUKOCYTE ESTERASE URINE: NEGATIVE
NITRITE URINE: NEGATIVE
PH URINE: 6.5
PROTEIN URINE: NEGATIVE
SPECIFIC GRAVITY URINE: 1.02
UROBILINOGEN URINE: 0.2 (ref 0.2–?)

## 2025-02-03 PROCEDURE — 0502F SUBSEQUENT PRENATAL CARE: CPT

## 2025-02-03 PROCEDURE — 59025 FETAL NON-STRESS TEST: CPT

## 2025-02-03 RX ORDER — FERROUS SULFATE 324(65)MG
324 (65 FE) TABLET, DELAYED RELEASE (ENTERIC COATED) ORAL
Qty: 90 | Refills: 5 | Status: ACTIVE | COMMUNITY
Start: 2025-02-03 | End: 1900-01-01

## 2025-02-04 ENCOUNTER — OUTPATIENT (OUTPATIENT)
Dept: INPATIENT UNIT | Facility: HOSPITAL | Age: 30
LOS: 1 days | End: 2025-02-04
Payer: COMMERCIAL

## 2025-02-04 VITALS — HEART RATE: 99 BPM | SYSTOLIC BLOOD PRESSURE: 114 MMHG | DIASTOLIC BLOOD PRESSURE: 81 MMHG

## 2025-02-04 VITALS — HEART RATE: 89 BPM | SYSTOLIC BLOOD PRESSURE: 122 MMHG | DIASTOLIC BLOOD PRESSURE: 79 MMHG

## 2025-02-04 DIAGNOSIS — O26.899 OTHER SPECIFIED PREGNANCY RELATED CONDITIONS, UNSPECIFIED TRIMESTER: ICD-10-CM

## 2025-02-04 PROCEDURE — 59025 FETAL NON-STRESS TEST: CPT

## 2025-02-04 PROCEDURE — G0463: CPT

## 2025-02-04 NOTE — OB PROVIDER TRIAGE NOTE - NSHPPHYSICALEXAM_GEN_ALL_CORE
Vitals:   T(C): 36.8 (02-04-25 @ 20:37), Max: 36.8 (02-04-25 @ 20:37)  T(F): 98.2 (02-04-25 @ 20:37), Max: 98.2 (02-04-25 @ 20:37)  HR: 89 (02-04-25 @ 21:38) (89 - 99)  BP: 122/79 (02-04-25 @ 21:38) (114/81 - 122/79)  RR: 18 (02-04-25 @ 20:37) (18 - 18)  SpO2: --    General: AAOx3, NAD  Abd: Soft, nontender, gravid  SVE: 1/70/-3    BMI:   BMI (kg/m2): 22.8 (02-03-25)    FHT: reactive and reassuring  [place]:  intermittent contractions noted

## 2025-02-04 NOTE — OB RN TRIAGE NOTE - IN THE PAST 12 MONTHS HAVE YOU USED DRUGS OTHER THAN THOSE REQUIRED FOR MEDICAL REASON?
Patient calls and leaves voicemail stating she had skin lesions removed on 8/30 and had sutures removed on 9/5, but there are what seems to be internal sutures coming to the surface.   Called and left voicemail for patient to return call to discuss.   
Patient calls and states there is one area on the left posterior ear and three areas on the right posterior ear where they can see blue suture poking out from skin.   Patient was asked to send photos over [facility] for further assessment but will likely recommend returning for a nurse visit to have sutures removed.   
Patient sends photos and was advised to present back to [facility] for suture removal.   
No

## 2025-02-04 NOTE — OB PROVIDER TRIAGE NOTE - NSOBPROVIDERNOTE_OBGYN_ALL_OB_FT
A/P: JERMAINE RODRIGUEZ is a 28yo  at 37w GA who presents to L&D triage for decreased fetal movement.    - BPP , reassuring   - Cervical exam 1/70/-3  - Discussed labor precautions  - Pt to schedule appt with Dr Graf in the office this week    D/w Dr Graf

## 2025-02-04 NOTE — OB PROVIDER TRIAGE NOTE - HISTORY OF PRESENT ILLNESS
JERMAINE RODRIGUEZ is a 28yo  at 37w GA who presents to L&D triage for decreased fetal movement. The patient reports that she felt baby moving yesterday and last night, but nothing today despite PO intake of coffee and water as well as movement.     Pt denies VB or LOF. She denies painful contractions, notes irregular tightening mostly at night.     Denies leakage of fluids, vaginal bleeding, painful contractions. Reports active fetal movement.   Denies other symptoms.     PNC @Dr Graf (late transfer of care following PNC at [facility] in [place])  Pregnancy course is significant for:   -ARTIE diagnosed at 20 weeks  -First trimester HG  POB:   PGYN: Abnormal Paps in 2018 and  - also HPV+ in , had negative colpo. Most recent pap was normal. -fibroids/-cysts, denied STD hx  PMH: none  PSH: wisdom teeth extraction  SH: Denies tobacco use, EtOH use and illicit drug use during the pregnancy; Feels safe at home  Meds: Prenatal vitamin, PO iron, Vitamin D  All: none

## 2025-02-05 ENCOUNTER — APPOINTMENT (OUTPATIENT)
Dept: ANTEPARTUM | Facility: CLINIC | Age: 30
End: 2025-02-05
Payer: COMMERCIAL

## 2025-02-05 ENCOUNTER — NON-APPOINTMENT (OUTPATIENT)
Age: 30
End: 2025-02-05

## 2025-02-05 ENCOUNTER — ASOB RESULT (OUTPATIENT)
Age: 30
End: 2025-02-05

## 2025-02-05 ENCOUNTER — APPOINTMENT (OUTPATIENT)
Dept: OBGYN | Facility: CLINIC | Age: 30
End: 2025-02-05
Payer: COMMERCIAL

## 2025-02-05 VITALS
SYSTOLIC BLOOD PRESSURE: 120 MMHG | HEIGHT: 64 IN | WEIGHT: 134.4 LBS | BODY MASS INDEX: 22.94 KG/M2 | DIASTOLIC BLOOD PRESSURE: 86 MMHG

## 2025-02-05 PROBLEM — Z3A.37 37 WEEKS GESTATION OF PREGNANCY: Status: ACTIVE | Noted: 2025-02-05

## 2025-02-05 PROCEDURE — 59426 ANTEPARTUM CARE ONLY: CPT

## 2025-02-05 PROCEDURE — 76816 OB US FOLLOW-UP PER FETUS: CPT

## 2025-02-05 PROCEDURE — 0502F SUBSEQUENT PRENATAL CARE: CPT

## 2025-02-05 PROCEDURE — 76819 FETAL BIOPHYS PROFIL W/O NST: CPT | Mod: 59

## 2025-02-06 DIAGNOSIS — Z3A.37 37 WEEKS GESTATION OF PREGNANCY: ICD-10-CM

## 2025-02-06 DIAGNOSIS — O26.893 OTHER SPECIFIED PREGNANCY RELATED CONDITIONS, THIRD TRIMESTER: ICD-10-CM

## 2025-02-06 DIAGNOSIS — O99.013 ANEMIA COMPLICATING PREGNANCY, THIRD TRIMESTER: ICD-10-CM

## 2025-02-06 DIAGNOSIS — O36.8130 DECREASED FETAL MOVEMENTS, THIRD TRIMESTER, NOT APPLICABLE OR UNSPECIFIED: ICD-10-CM

## 2025-02-06 DIAGNOSIS — D50.9 IRON DEFICIENCY ANEMIA, UNSPECIFIED: ICD-10-CM

## 2025-02-12 ENCOUNTER — NON-APPOINTMENT (OUTPATIENT)
Age: 30
End: 2025-02-12

## 2025-02-12 ENCOUNTER — OUTPATIENT (OUTPATIENT)
Dept: INPATIENT UNIT | Facility: HOSPITAL | Age: 30
LOS: 1 days | End: 2025-02-12
Payer: COMMERCIAL

## 2025-02-12 ENCOUNTER — APPOINTMENT (OUTPATIENT)
Dept: OBGYN | Facility: CLINIC | Age: 30
End: 2025-02-12
Payer: COMMERCIAL

## 2025-02-12 VITALS
SYSTOLIC BLOOD PRESSURE: 105 MMHG | RESPIRATION RATE: 16 BRPM | TEMPERATURE: 98 F | HEART RATE: 87 BPM | DIASTOLIC BLOOD PRESSURE: 61 MMHG

## 2025-02-12 VITALS
RESPIRATION RATE: 16 BRPM | SYSTOLIC BLOOD PRESSURE: 123 MMHG | HEART RATE: 91 BPM | TEMPERATURE: 98 F | DIASTOLIC BLOOD PRESSURE: 69 MMHG

## 2025-02-12 VITALS
DIASTOLIC BLOOD PRESSURE: 70 MMHG | BODY MASS INDEX: 23.13 KG/M2 | HEIGHT: 64 IN | SYSTOLIC BLOOD PRESSURE: 112 MMHG | WEIGHT: 135.5 LBS

## 2025-02-12 DIAGNOSIS — O26.899 OTHER SPECIFIED PREGNANCY RELATED CONDITIONS, UNSPECIFIED TRIMESTER: ICD-10-CM

## 2025-02-12 DIAGNOSIS — Z3A.38 38 WEEKS GESTATION OF PREGNANCY: ICD-10-CM

## 2025-02-12 LAB
APPEARANCE: CLEAR
BILIRUBIN URINE: NEGATIVE
BLOOD URINE: NEGATIVE
COLOR: YELLOW
GLUCOSE QUALITATIVE U: NEGATIVE
HCT VFR BLD CALC: 36.9 % — SIGNIFICANT CHANGE UP (ref 34.5–45)
HGB BLD-MCNC: 12.3 G/DL — SIGNIFICANT CHANGE UP (ref 11.5–15.5)
IRON SATN MFR SERPL: 20 % — SIGNIFICANT CHANGE UP (ref 14–50)
IRON SATN MFR SERPL: 96 UG/DL — SIGNIFICANT CHANGE UP (ref 37–145)
KETONES URINE: NEGATIVE
LEUKOCYTE ESTERASE URINE: ABNORMAL
MCHC RBC-ENTMCNC: 26.1 PG — LOW (ref 27–34)
MCHC RBC-ENTMCNC: 33.3 G/DL — SIGNIFICANT CHANGE UP (ref 32–36)
MCV RBC AUTO: 78.2 FL — LOW (ref 80–100)
NITRITE URINE: NEGATIVE
NRBC # BLD AUTO: 0 K/UL — SIGNIFICANT CHANGE UP (ref 0–0)
NRBC # FLD: 0 K/UL — SIGNIFICANT CHANGE UP (ref 0–0)
NRBC BLD AUTO-RTO: 0 /100 WBCS — SIGNIFICANT CHANGE UP (ref 0–0)
PH URINE: 7
PLATELET # BLD AUTO: 209 K/UL — SIGNIFICANT CHANGE UP (ref 150–400)
PMV BLD: 13.3 FL — HIGH (ref 7–13)
PROTEIN URINE: NEGATIVE
RBC # BLD: 4.72 M/UL — SIGNIFICANT CHANGE UP (ref 3.8–5.2)
RBC # FLD: 16.1 % — HIGH (ref 10.3–14.5)
SPECIFIC GRAVITY URINE: 1.01
TIBC SERPL-MCNC: 490 UG/DL — HIGH (ref 220–430)
TRANSFERRIN SERPL-MCNC: 343 MG/DL — SIGNIFICANT CHANGE UP (ref 192–382)
UROBILINOGEN URINE: 0.2 (ref 0.2–?)
WBC # BLD: 7.96 K/UL — SIGNIFICANT CHANGE UP (ref 3.8–10.5)
WBC # FLD AUTO: 7.96 K/UL — SIGNIFICANT CHANGE UP (ref 3.8–10.5)

## 2025-02-12 PROCEDURE — 84466 ASSAY OF TRANSFERRIN: CPT

## 2025-02-12 PROCEDURE — 59025 FETAL NON-STRESS TEST: CPT | Mod: 26

## 2025-02-12 PROCEDURE — 99221 1ST HOSP IP/OBS SF/LOW 40: CPT | Mod: 25

## 2025-02-12 PROCEDURE — 85027 COMPLETE CBC AUTOMATED: CPT

## 2025-02-12 PROCEDURE — G0463: CPT

## 2025-02-12 PROCEDURE — 59025 FETAL NON-STRESS TEST: CPT

## 2025-02-12 PROCEDURE — 36415 COLL VENOUS BLD VENIPUNCTURE: CPT

## 2025-02-12 PROCEDURE — 83540 ASSAY OF IRON: CPT

## 2025-02-12 PROCEDURE — 0502F SUBSEQUENT PRENATAL CARE: CPT

## 2025-02-12 PROCEDURE — 83550 IRON BINDING TEST: CPT

## 2025-02-12 NOTE — OB PROVIDER TRIAGE NOTE - NSHPPHYSICALEXAM_GEN_ALL_CORE
T(C): 36.8 (02-12-25 @ 15:28), Max: 36.8 (02-12-25 @ 15:22)  HR: 91 (02-12-25 @ 15:28) (91 - 91)  BP: 123/69 (02-12-25 @ 15:28) (123/69 - 123/69)  RR: 16 (02-12-25 @ 15:28) (16 - 16)    Gen: NAD, well-appearing, AAOx3   Abd: Soft, gravid  Ext: non-tender, non-edematous  Bedside sono: pending  FHT: 145 w/mod variation +accel -decel  [place]: No ctx T(C): 36.8 (02-12-25 @ 15:28), Max: 36.8 (02-12-25 @ 15:22)  HR: 91 (02-12-25 @ 15:28) (91 - 91)  BP: 123/69 (02-12-25 @ 15:28) (123/69 - 123/69)  RR: 16 (02-12-25 @ 15:28) (16 - 16)    Gen: NAD, well-appearing, AAOx3   Abd: Soft, gravid  Ext: non-tender, non-edematous    Bedside sono: BPP 8/8, VTX, posterior fundal placenta, LINA 22.59  FHT: 145 w/mod variation +accel -decel  [place]: No ctx

## 2025-02-12 NOTE — OB PROVIDER TRIAGE NOTE - NS_OBGYNHISTORY_OBGYN_ALL_OB_FT
POB:   PGYN: -fibroids, +ovarian cysts, denies STD hx, +1 abnormal PAP in  w/normal colposcopy (subsequent PAP normal)  PMH: ARTIE, sickle cell trait  PSH: oral surgery  SH: Denies EtOH, tobacco and illicit drug use during this pregnancy; feels safe at home   Meds: PNVs, iron supplements  Allergies: clindamycin

## 2025-02-12 NOTE — OB RN TRIAGE NOTE - FALL HARM RISK - UNIVERSAL INTERVENTIONS
Bed in lowest position, wheels locked, appropriate side rails in place/Call bell, personal items and telephone in reach/Instruct patient to call for assistance before getting out of bed or chair/Non-slip footwear when patient is out of bed/[place] to call system/Purposeful Proactive Rounding/Room/bathroom lighting operational, light cord in reach

## 2025-02-12 NOTE — OB PROVIDER TRIAGE NOTE - ATTENDING COMMENTS
29y  at 38 weeks presents to L&D from the OB office due to non-reactive NST, now reactive for duration of prolonged monitoring, good fetal movement, no s/s active labor or oligohydramnios. Return precautions discussed.  Impression NST: reactive

## 2025-02-12 NOTE — OB PROVIDER TRIAGE NOTE - HISTORY OF PRESENT ILLNESS
29y  at 38 weeks presents to L&D from the OB office due to minimal variations on FHT. Patient was at her 38w routine visit with Dr. Graf and was told that there were minimal acceleration in the 45 minute FHT, which prompted her to come to L&D for further evaluation. Patient states that at baseline she has been feeling reduced fetal movement, and there has been no changes in fetal movement recently. Patient denies vaginal bleeding, contractions and leakage of fluid. Of note patient has a history of sickle cell trait and was diagnosed with ARTIE prior to the pregnancy and during, so she has been taking iron supplements. Patient endorses feeling more fatigued, lightheaded the past couple days. Denies fevers, chills, nausea, vomiting, chest pain, SOB, and headache. No other complaints at this time.     Prenatal course uncomplicated.       29y  at 38 weeks presents to L&D from the OB office due to minimal variations on FHT. Patient was at her 38w routine visit with Dr. Graf and was told that there were minimal acceleration in the 45 minute FHT, which prompted her to come to L&D for further evaluation. Patient states that at baseline she has been feeling reduced fetal movement, and there has been no changes in fetal movement recently. Patient denies vaginal bleeding, contractions and leakage of fluid. Patient endorses feeling more fatigued, lightheaded the past couple days. Denies fevers, chills, nausea, vomiting, chest pain, SOB, and headache. No other complaints at this time.     Prenatal course uncomplicated.      POB:   PGYN: -fibroids, +ovarian cysts, denies STD hx, +1 abnormal PAP in  w/normal colposcopy (subsequent PAP normal)  PMH: iron deficiency anemia, sickle cell trait  PSH: oral surgery  SH: Denies EtOH, tobacco and illicit drug use during this pregnancy  Meds: PNVs, iron supplements  Allergies: clindamycin 29y  at 38 weeks presents to L&D from the OB office due to minimal variability on FHT. Patient was at her 38w routine visit with Dr. Graf and was told that there were minimal acceleration in the 45 minute FHT, which prompted her to come to L&D for further evaluation. Patient states that at baseline she has been feeling reduced fetal movement, and there has been no changes in fetal movement recently. Patient denies vaginal bleeding, contractions and leakage of fluid. Patient endorses feeling more fatigued, lightheaded the past couple days. Denies fevers, chills, nausea, vomiting, chest pain, SOB, and headache. No other complaints at this time.     Prenatal course uncomplicated.      POB:   PGYN: -fibroids, +ovarian cysts, denies STD hx, +1 abnormal PAP in  w/normal colposcopy (subsequent PAP normal)  PMH: iron deficiency anemia, sickle cell trait  PSH: oral surgery  SH: Denies EtOH, tobacco and illicit drug use during this pregnancy  Meds: PNVs, iron supplements  Allergies: clindamycin

## 2025-02-12 NOTE — OB RN TRIAGE NOTE - NSICDXPASTMEDICALHX_GEN_ALL_CORE_FT
PAST MEDICAL HISTORY:  H/O iron deficiency anemia     H/O sickle cell trait     History of colposcopy     History of elevated glucose     HPV in female      PAST MEDICAL HISTORY:  H/O hyperemesis gravidarum     H/O iron deficiency anemia     H/O sickle cell trait     History of colposcopy     History of elevated glucose     HPV in female

## 2025-02-12 NOTE — OB PROVIDER TRIAGE NOTE - NSICDXPASTMEDICALHX_GEN_ALL_CORE_FT
PAST MEDICAL HISTORY:  H/O iron deficiency anemia     H/O sickle cell trait     History of colposcopy     History of elevated glucose     HPV in female

## 2025-02-12 NOTE — OB PROVIDER TRIAGE NOTE - NSOBPROVIDERNOTE_OBGYN_ALL_OB_FT
29y  at 38 weeks presents to L&D from the OB office due to minimal variations on FHT.    A/P:   -VSS  -NST in triage  -Fetus: Cat I tracing. Continuous toco and fetal monitoring.     Discussed with Dr. Uribe 29y  at 38 weeks presents to L&D from the OB office due to minimal variations on FHT.    A/P:   -VSS  -NST in triage  -Fetus: Cat I tracing. Continuous toco and fetal monitoring  -F/u CBC and iron studies    Discussed with Dr. Uribe A/P: 29y  at 38 weeks presents to L&D from the OB office due to NRFHT.  -VSS  -FHT reassuring  -F/u CBC and iron studies  -Bedside sono: BPP 8/8, VTX, posterior fundal placenta, LINA 22.59    Plan for prolonged fetal monitoring.    Discussed with Dr. Olivier/Lesia A/P: 29y  at 38 weeks presents to L&D from the OB office due to NRFHT.  -VSS  -FHT reassuring  -Bedside sono: BPP 8/8, VTX, posterior fundal placenta, LINA 22.59    NST reassuring after prolonged fetal monitoring. BPP 8/8. Patient cleared for discharge with outpatient follow up. Return precautions given.    Discussed with Dr. Olivier

## 2025-02-12 NOTE — OB RN TRIAGE NOTE - CURRENT PREGNANCY COMPLICATIONS, OB PROFILE
"Anai Roldan (86 y.o. Male)             Date of Birth Social Security Number Address Home Phone MRN    1935  [id number] [place] [address] [phone] [id number]    [place]ondo[place] Marital Status             None        Admission Date Admission Type Admitting Provider Attending Provider Department, Room/Bed    10/5/21 Emergency Herve Baez MD Heimer, Brian T, MD [facility] NEURO HEART, 259/1    Discharge Date Discharge Disposition Discharge Destination                         Attending Provider: Herve Baez MD    Allergies: No Known Allergies    Isolation: None   Infection: None   Code Status: CPR   Advance Care Planning Activity    Ht: 175.3 cm (69\")   Wt: 84.1 kg (185 lb 4.8 oz)    Admission Cmt: None   Principal Problem: None                Active Insurance as of 10/5/2021     Primary Coverage     Payor Plan Insurance Group Employer/Plan Group    MEDICARE MEDICARE A & B      Payor Plan Address Payor Plan Phone Number Payor Plan Fax Number Effective Dates    [address] [phone]  1/1/2002 - None Entered    [place] [id number]       Subscriber Name Subscriber Birth Date Member ID       ANAI ROLDAN 1935 [id number]           Secondary Coverage     Payor Plan Insurance Group Employer/Plan Group    [employer] [place] [place] [id number]     Payor Plan Address Payor Plan Phone Number Payor Plan Fax Number Effective Dates    [address] [address] [phone]  1/1/2018 - None Entered    [facility] [id number]       Subscriber Name Subscriber Birth Date Member ID       ANAI ROLDAN 1935 [id number]                 Emergency Contacts      (Rel.) Home Phone Work Phone Mobile Phone    KALLI DAS (Daughter) -- -- [phone]               History & Physical      Herve Baez MD at 10/06/21 0802          Patient Care Team:  Herve Baez MD as PCP - General (Family Medicine)    Chief Conplaint  Subjective     The patient is a 86 y.o. " male presents with acute mental status changes with lethargy, hypotension and electrolyte disturbance with evidence of a community-acquired pneumonia present upon admission and an acute lung mass    HPI  The patient was found on the day of presentation to be lethargic and to be poorly able to respond.  They were uncertain about the duration of his symptom complex but he was brought to the [facility] emergency room for evaluation where he was admitted and found to have a community-acquired pneumonia as well as a large low-density mass in the right lung base.  He is poorly able to give me any additional information about his presentation although he has improved from a neurocognitive standpoint since presentation but clearly not at baseline.  Review of Systems  Review of Systems   Unable to perform ROS: Mental status change       History  Past Medical History:   Diagnosis Date   • Arthritis    • Hyperlipidemia    • Hypertension    • Neck pain      Past Surgical History:   Procedure Laterality Date   • HERNIA REPAIR       Family History   Problem Relation Age of Onset   • Cancer Mother    • Diabetes Mother    • Cancer Father    • Prostate cancer Father    • Heart attack Father      Social History     Tobacco Use   • Smoking status: Never Smoker   • Smokeless tobacco: Never Used   Vaping Use   • Vaping Use: Never used   Substance Use Topics   • Alcohol use: Never   • Drug use: Defer     Allergies:  Patient has no known allergies.    Objective     Vital Signs  Temp:  [95.4 °F (35.2 °C)] 95.4 °F (35.2 °C)  Heart Rate:  [] 94  Resp:  [18-19] 18  BP: ()/(4-84) 141/77      Physical Exam:   Physical Exam  Vitals and nursing note reviewed.   Constitutional:       Appearance: He is ill-appearing. He is not toxic-appearing.   Cardiovascular:      Rate and Rhythm: Rhythm irregular.      Heart sounds: Normal heart sounds.   Pulmonary:      Breath sounds: Normal breath sounds.   Skin:     General: Skin is  warm.   Neurological:      Mental Status: He is alert.          Results Review:   CBC    Results from last 7 days   Lab Units 10/05/21  1951   WBC 10*3/mm3 14.20*   HEMOGLOBIN g/dL 11.7*   PLATELETS 10*3/mm3 392     BMP   Results from last 7 days   Lab Units 10/05/21  1951   SODIUM mmol/L 120*   POTASSIUM mmol/L 2.5*   CHLORIDE mmol/L 75*   CO2 mmol/L 21.0*   BUN mg/dL 16   CREATININE mg/dL 0.96   GLUCOSE mg/dL 178*   MAGNESIUM mg/dL 1.5*     Cr Clearance Estimated Creatinine Clearance: 62.3 mL/min (by C-G formula based on SCr of 0.96 mg/dL).  Coag   Results from last 7 days   Lab Units 10/05/21  1951   INR  1.18*   APTT seconds 30.8     HbA1C No results found for: HGBA1C  Blood Glucose No results found for: POCGLU  Infection     CMP   Results from last 7 days   Lab Units 10/05/21  1951   SODIUM mmol/L 120*   POTASSIUM mmol/L 2.5*   CHLORIDE mmol/L 75*   CO2 mmol/L 21.0*   BUN mg/dL 16   CREATININE mg/dL 0.96   GLUCOSE mg/dL 178*   ALBUMIN g/dL 3.30*   BILIRUBIN mg/dL 0.6   ALK PHOS U/L 83   AST (SGOT) U/L 155*   ALT (SGPT) U/L 92*   LIPASE U/L 47     UA    Results from last 7 days   Lab Units 10/05/21  1957   NITRITE UA  Negative   WBC UA /HPF 31-50*   BACTERIA UA /HPF None Seen   SQUAM EPITHEL UA /HPF 7-12*     Radiology(recent) CT Head Without Contrast    Result Date: 10/5/2021  No acute intracranial abnormality. Electronically signed by:  Lenny Mckee  10/5/2021 9:37 PM    CT Chest With Contrast Diagnostic    Result Date: 10/5/2021  1. There is a large low-density mass in the right lung base measuring 9.5 x 5.3 cm. This is favored to represent a neoplastic process which must be considered and excluded. This lesion would be amenable to CT-guided biopsy as determined clinically. Alternatively, a PET/CT study could be obtained for better characterization. Follow-up is recommended. 2. Reticulonodular type infiltrates in the right upper lobe with additional nodular type groundglass and confluent groundglass  airspace opacities in the left upper lobe and lingula. Findings are most likely related to infectious bronchopneumonia, possibly related to viral pneumonia and/or aspiration. COVID pneumonia should be considered. 3. Element of interlobular septal thickening may represent early pulmonary edema. 4. Emphysema. 5. Advanced coronary artery calcifications. 6. Enlarged subcarinal lymph node measuring 1.8 cm in short axis. Metastatic disease must be excluded. 7. Hepatic steatosis. The liver also demonstrates a very subtle micronodular contour which may represent liver disease or early cirrhosis. Correlation with hepatic function is recommended. 8. Diverticulosis. 9. Severe atherosclerotic disease. 10. Additional incidental and chronic findings as above. *FLEISCHNER SOCIETY GUIDELINES: Low risk patient: <6mm - Low Risk, no further f/u >6-8mm - low dose CT 6-12 mo, then 2nd f/u CT at 18-24mo >8mm - low dose CT 3,9,24mo OR PET/CT OR Biopsy High Risk Patient: <6mm - optional CT at 12 mo >6-8mm - low dose CT 6-12 mo, then 2nd f/u CT at 18-24mo >8mm - low dose CT at 3 mo, OR PET/CT OR Biopsy MULTIPLE NODULES: Low Risk Patient: <6mm - Low Risk, no further f/u >6-8mm - low dose CT 3-6 mo, then 2nd f/u CT at 18-24mo >8mm - low dose CT 3-6 mo, then 2nd f/u CT at 18-24mo High Risk Patient: <6mm - High risk, multiple nodules, optional CT at 12 mo >6-8mm - low dose CT 3-6 mo, then 2nd f/u CT at 18-24mo >8mm - low dose CT 3-6 mo, then 2nd f/u CT at 18-24mo Slot 67 Electronically signed by:  Arcenio Vanegas M.D.  10/5/2021 9:30 PM    CT Abdomen Pelvis With Contrast    Result Date: 10/5/2021  1. There is a large low-density mass in the right lung base measuring 9.5 x 5.3 cm. This is favored to represent a neoplastic process which must be considered and excluded. This lesion would be amenable to CT-guided biopsy as determined clinically. Alternatively, a PET/CT study could be obtained for better characterization. Follow-up is recommended. 2.  Reticulonodular type infiltrates in the right upper lobe with additional nodular type groundglass and confluent groundglass airspace opacities in the left upper lobe and lingula. Findings are most likely related to infectious bronchopneumonia, possibly related to viral pneumonia and/or aspiration. COVID pneumonia should be considered. 3. Element of interlobular septal thickening may represent early pulmonary edema. 4. Emphysema. 5. Advanced coronary artery calcifications. 6. Enlarged subcarinal lymph node measuring 1.8 cm in short axis. Metastatic disease must be excluded. 7. Hepatic steatosis. The liver also demonstrates a very subtle micronodular contour which may represent liver disease or early cirrhosis. Correlation with hepatic function is recommended. 8. Diverticulosis. 9. Severe atherosclerotic disease. 10. Additional incidental and chronic findings as above. *FLEISCHNER SOCIETY GUIDELINES: Low risk patient: <6mm - Low Risk, no further f/u >6-8mm - low dose CT 6-12 mo, then 2nd f/u CT at 18-24mo >8mm - low dose CT 3,9,24mo OR PET/CT OR Biopsy High Risk Patient: <6mm - optional CT at 12 mo >6-8mm - low dose CT 6-12 mo, then 2nd f/u CT at 18-24mo >8mm - low dose CT at 3 mo, OR PET/CT OR Biopsy MULTIPLE NODULES: Low Risk Patient: <6mm - Low Risk, no further f/u >6-8mm - low dose CT 3-6 mo, then 2nd f/u CT at 18-24mo >8mm - low dose CT 3-6 mo, then 2nd f/u CT at 18-24mo High Risk Patient: <6mm - High risk, multiple nodules, optional CT at 12 mo >6-8mm - low dose CT 3-6 mo, then 2nd f/u CT at 18-24mo >8mm - low dose CT 3-6 mo, then 2nd f/u CT at 18-24mo Slot 67 Electronically signed by:  Arcenio Vanegas M.D.  10/5/2021 9:30 PM    XR Chest 1 View    Result Date: 10/5/2021  1. There is an abnormality in the right lower chest. Part of this has a masslike configuration. It may be due to parenchymal parenchymal disease, pleural disease, or both. A lung mass is possible. Further evaluation with chest CT with contrast is  recommended. 2. Question some minor opacity in the lateral left midlung which may be due to pneumonia.  Electronically Signed By-Margarita Cooper MD On:10/5/2021 9:06 PM This report was finalized on [id number] by  Margarita Cooper MD.       Assessment:    Acute mental status changes secondary to acute metabolic encephalopathy secondary to community-acquired pneumonia  Acute sepsis secondary to the above  Acute lung mass  Panlobular COPD with emphysema  Chronic mucopurulent bronchitis  Hepatic steatosis  Hepatic transaminase derangement  Atherosclerotic heart disease of native coronary arteries with angina pectoris  Coronary calcifications  Chronic kidney disease stage II  Microscopic hematuria  Hypertension associated chronic kidney disease stage II  Hypokalemia likely secondary to the above  Hyponatremia  DDD L/S  Myofascial pain syndrome  Narcotic dependency    Plan:  Pulmonary and oncologic evaluations//nephrology to participate //aggressive pulmonary toilet  Expected Length of Stay 5 days    I discussed the patients findings and my recommendations with patient and nursing staff.     Herve Baez MD  10/06/21  08:02 EDT          Electronically signed by Herve Baez MD at 10/06/21 0824          Physician Progress Notes (last 48 hours)      Estefania Sanchez MD at 10/10/21 1043          Hematology/Oncology Inpatient Progress Note    PATIENT NAME: Prabhjot Roldan  : 1935  MRN: [id number]    CHIEF COMPLAINT: Lethargy and altered mental status    HISTORY OF PRESENT ILLNESS:    86 y.o. male presented to [facility] on 10/5/2021 with a complaint of altered mental status.  He was noted to be lethargic, hypotensive upon presentation.  Electrolyte abnormalities and community-acquired pneumonia is suspected and he was admitted.  Incidentally CT of the chest showed right lung mass lower lobe.     10/06/21  Hematology/Oncology was consulted for right lung mass.  His daughter was there.  Is having  diminished appetite.  He is not able to eat due to lack of dentures since he lost left home.     Past Medical History:  #1 hypertension  2.  Hyperlipidemia  3.  Arthritis  Surgical History:  Hernia repair  Social History:  He was ex-smoker.  He quit smoking 40 years ago.  There is no history of alcohol abuse.  Family History:  1.  Mother had carcinoma.  She also suffered from diabetes mellitus.  2.  Father had prostate carcinoma.  He  from myocardial infarction.  Allergies:  No known allergies.    INTERVAL HISTORY:  • 10/7/2021 CT-guided lung biopsy was done.  • 10/8/2021 path report is pending.  WBC 13, hemoglobin 11.1, platelet count 300,000.   (1-40)  (1-41) total bilirubin 0.9 (0-1.2)  • 10/9/2021 right lung biopsy showed invasive squamous cell carcinoma, CK 5/6+, TTF-1 negative  • 10/10/2021 CT cervical spine showed C1 lytic lesion.    Subjective   Patient continues to feel fatigued and sleeps most of the time    ROS:  Review of Systems   Constitutional: Positive for activity change and appetite change. Negative for chills, fatigue, fever and unexpected weight change.   HENT: Negative for congestion, dental problem, hearing loss, mouth sores, nosebleeds, sore throat and trouble swallowing.    Eyes: Negative for photophobia and visual disturbance.   Respiratory: Negative for cough and shortness of breath.    Cardiovascular: Negative for chest pain, palpitations and leg swelling.   Gastrointestinal: Negative for abdominal distention, abdominal pain, blood in stool, constipation, diarrhea, nausea and vomiting.   Endocrine: Negative for cold intolerance and heat intolerance.   Genitourinary: Negative for decreased urine volume, difficulty urinating, frequency, hematuria and urgency.   Musculoskeletal: Negative for arthralgias, back pain and gait problem.   Skin: Negative for rash and wound.   Neurological: Negative for dizziness, tremors, weakness, light-headedness, numbness and headaches.  "  Hematological: Negative for adenopathy. Does not bruise/bleed easily.   Psychiatric/Behavioral: Positive for confusion. Negative for hallucinations. The patient is not nervous/anxious.    All other systems reviewed and are negative.       MEDICATIONS:    Scheduled Meds:  aspirin, 81 mg, Oral, Daily  budesonide, 0.5 mg, Nebulization, BID - RT  cefTRIAXone, 1 g, Intravenous, Q24H  dilTIAZem CD, 180 mg, Oral, Q24H  enoxaparin, 40 mg, Subcutaneous, Daily  ipratropium-albuterol, 3 mL, Nebulization, 4x Daily - RT  potassium phosphate (monobasic), 1,000 mg, Oral, Once       Continuous Infusions:      PRN Meds:  HYDROcodone-acetaminophen  •  magnesium sulfate **OR** magnesium sulfate **OR** magnesium sulfate  •  potassium chloride **OR** potassium chloride **OR** potassium chloride  •  [COMPLETED] Insert peripheral IV **AND** sodium chloride  •  tiZANidine     ALLERGIES:  No Known Allergies    Objective    VITALS:   /69 (BP Location: Left arm, Patient Position: Lying)   Pulse 102   Temp 97.3 °F (36.3 °C) (Oral)   Resp 19   Ht 175.3 cm (69\")   Wt 84.1 kg (185 lb 4.8 oz)   SpO2 99%   BMI 27.36 kg/m²     PHYSICAL EXAM:  Physical Exam  Vitals and nursing note reviewed.   Constitutional:       General: He is not in acute distress.     Appearance: Normal appearance. He is well-developed. He is not diaphoretic.   HENT:      Head: Normocephalic and atraumatic.      Nose: Nose normal.      Mouth/Throat:      Mouth: Mucous membranes are moist.      Pharynx: Oropharynx is clear. No oropharyngeal exudate or posterior oropharyngeal erythema.   Eyes:      General: No scleral icterus.     Extraocular Movements: Extraocular movements intact.      Conjunctiva/sclera: Conjunctivae normal.      Pupils: Pupils are equal, round, and reactive to light.   Cardiovascular:      Rate and Rhythm: Normal rate and regular rhythm.      Heart sounds: Normal heart sounds. No murmur heard.      Pulmonary:      Effort: Pulmonary effort is " normal. No respiratory distress.      Breath sounds: No wheezing or rales.      Comments: There is a diminished breath sounds in the right infrascapular area  Chest:   Breasts:      Right: No supraclavicular adenopathy.      Left: No supraclavicular adenopathy.       Abdominal:      General: Bowel sounds are normal. There is no distension.      Palpations: Abdomen is soft. There is no mass.      Tenderness: There is no abdominal tenderness. There is no guarding.   Genitourinary:     Comments: Deferred   Musculoskeletal:         General: No swelling, tenderness or deformity. Normal range of motion.      Cervical back: Normal range of motion and neck supple.      Right lower leg: No edema.      Left lower leg: No edema.   Lymphadenopathy:      Cervical: No cervical adenopathy.      Upper Body:      Right upper body: No supraclavicular adenopathy.      Left upper body: No supraclavicular adenopathy.   Skin:     General: Skin is warm and dry.      Coloration: Skin is not pale.      Findings: No bruising, erythema or rash.   Neurological:      General: No focal deficit present.      Mental Status: He is alert and oriented to person, place, and time.      Coordination: Coordination normal.   Psychiatric:         Mood and Affect: Mood normal.         Behavior: Behavior normal.         Thought Content: Thought content normal.           RECENT LABS:  Lab Results (last 24 hours)     Procedure Component Value Units Date/Time    Comprehensive Metabolic Panel [[id number]]  (Abnormal) Collected: 10/10/21 0442    Specimen: Blood Updated: 10/10/21 0621     Glucose 87 mg/dL      BUN 15 mg/dL      Creatinine 0.59 mg/dL      Sodium 130 mmol/L      Potassium 3.5 mmol/L      Chloride 91 mmol/L      CO2 30.0 mmol/L      Calcium 8.4 mg/dL      Total Protein 6.6 g/dL      Albumin 2.70 g/dL      ALT (SGPT) 412 U/L      AST (SGOT) 236 U/L      Alkaline Phosphatase 68 U/L      Total Bilirubin 0.5 mg/dL      eGFR Non African Amer 131  mL/min/1.73      Globulin 3.9 gm/dL      A/G Ratio 0.7 g/dL      BUN/Creatinine Ratio 25.4     Anion Gap 9.0 mmol/L     Narrative:      GFR Normal >60  Chronic Kidney Disease <60  Kidney Failure <15      Phosphorus [[id number]]  (Abnormal) Collected: 10/10/21 0442    Specimen: Blood Updated: 10/10/21 0621     Phosphorus 2.3 mg/dL     Magnesium [[id number]]  (Normal) Collected: 10/10/21 0442    Specimen: Blood Updated: 10/10/21 0621     Magnesium 1.9 mg/dL     Calcium, Ionized [[id number]] Collected: 10/10/21 0442    Specimen: Blood Updated: 10/10/21 0612    CBC (No Diff) [[id number]]  (Abnormal) Collected: 10/10/21 0442    Specimen: Blood Updated: 10/10/21 0602     WBC 10.10 10*3/mm3      RBC 3.01 10*6/mm3      Hemoglobin 9.2 g/dL      Hematocrit 26.7 %      MCV 88.6 fL      MCH 30.5 pg      MCHC 34.4 g/dL      RDW 14.9 %      RDW-SD 45.5 fl      MPV 7.4 fL      Platelets 363 10*3/mm3     Blood Culture - Blood, Arm, Right [[id number]]  (Normal) Collected: 10/05/21 2001    Specimen: Blood from Arm, Right Updated: 10/09/21 2015     Blood Culture No growth at 4 days    Narrative:      The previously reported component GRAM STAIN is no longer being reported. Previous result was <null> (Reference Range: [Reference Range]) on 10/8/2021 at 2015 EDT.    Blood Culture - Blood, Arm, Left [[id number]]  (Normal) Collected: 10/05/21 1951    Specimen: Blood from Arm, Left Updated: 10/09/21 2015     Blood Culture No growth at 4 days    Narrative:      The previously reported component GRAM STAIN is no longer being reported. Previous result was <null> (Reference Range: [Reference Range]) on 10/8/2021 at 2015 EDT.          PENDING RESULTS: Right lung biopsy report is pending    IMAGING REVIEWED:  CT Cervical Spine Without Contrast    Result Date: 10/9/2021  1.Osseous destructive changes involving right C1 lateral mass, concerning for metastatic disease. 2.Degenerative changes of cervical spine as described above.  Electronically Signed  By-Bogdan Marquez MD On:10/9/2021 5:03 PM This report was finalized on [id number] by  Bogdan Marquez MD.      I have reviewed the patient's labs, imaging, reports, and other clinician documentation.    Assessment/Plan   ASSESSMENT:  1.Right lung lower lobe mass, characterized as hypodense lesion: Patient had a biopsy the biopsy report is pending.  Attempted aspiration of this mass was not successful and it suggestive of solid or thick purulent effusion.  He is currently receiving antibiotics with ceftriaxone.  Final Diagnosis   Mass, right lower lobe, biopsies:    Invasive poorly differentiated squamous cell carcinoma     See comment        ,7   Comment    Immunohistochemical stains were applied with valid controls and are reported as follows: The tumor is positive for cytokeratin 5/6, p40 and p63. The tumor is negative for cytokeratin 7, TTF-1 and napsin A. The immunohistochemical staining pattern supports the rendered diagnosis   No family members with him at this time.  I will divulge this information in the presence of family members. And his sister was here and his metastatic disease is discussed with him  2.Anemia: He has normochromic normocytic anemia.  Iron profile is inconclusive with elevated ferritin and iron saturation 9%.  B12 and folic acid are normal     3.Elevated LFTs: There is no evidence of metastatic disease in the liver based on the imaging.  Hepatitis profile is negative.  He will require total body PET scan as an outpatient since lung biopsy showed squamous cell carcinoma.  4.  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  He is currently getting Samsca and managed by nephrologist   5.Cervical spine 1 atlas mets: CT of cervical spine showed mets to atlas. Neurosurgical evaluation and XRT consultation will be requested    PLAN:  1. Neurosurgeon evaluation regarding mets Cervical spine atlas  2. Total body PET scan as an outpatient.  3. Radiation oncology consultation      "      I discussed the patients findings and my recommendations with patient.        Electronically signed by Estefania Sanchez MD at 10/10/21 1221     Andrade Barnes MD at 10/10/21 0925          NEPHROLOGY PROGRESS NOTE------[facility]    [facility]  [phone]  Ivis Barnes MD      Patient Care Team:  Herve Baez MD as PCP - General (Family Medicine)  Uche Mojica MD as Consulting Physician (Nephrology)  Estefania Sanchez MD as Consulting Physician (Hematology and Oncology)      Provider:  Ivis Barnes MD  Patient Name: Prabhjot Roldan  :  1935    SUBJECTIVE:    F/U HYPONATREMIA    No major new events overnight. No SOB, CP. No HA, blurry vision, tremors, or seizures    Medication:  aspirin, 81 mg, Oral, Daily  atorvastatin, 20 mg, Oral, Nightly  budesonide, 0.5 mg, Nebulization, BID - RT  cefTRIAXone, 1 g, Intravenous, Q24H  dilTIAZem CD, 180 mg, Oral, Q24H  enoxaparin, 40 mg, Subcutaneous, Daily  ferric gluconate, 125 mg, Intravenous, Daily  ipratropium-albuterol, 3 mL, Nebulization, 4x Daily - RT           OBJECTIVE    Vital Sign Min/Max for last 24 hours  Temp  Min: 97.6 °F (36.4 °C)  Max: 98.4 °F (36.9 °C)   BP  Min: 93/51  Max: 119/57   Pulse  Min: 80  Max: 109   Resp  Min: 13  Max: 20   SpO2  Min: 96 %  Max: 99 %   No data recorded   Weight  Min: 84.1 kg (185 lb 4.8 oz)  Max: 84.1 kg (185 lb 4.8 oz)     Flowsheet Rows      First Filed Value   Admission Height  175.3 cm (69\") Documented at 10/05/2021 1928   Admission Weight  86.2 kg (190 lb) Documented at 10/05/2021 1928          No intake/output data recorded.  I/O last 3 completed shifts:  In: 720 [P.O.:720]  Out: 1800 [Urine:1800]    Physical Exam:  General Appearance: alert, appears stated age and cooperative  Head: normocephalic, without obvious abnormality and atraumatic  Eyes: conjunctivae and sclerae normal and no icterus  Neck: supple " and no JVD  Lungs: +SCATTERED RHONCHI  Heart: regular rhythm & normal rate and normal S1, S2 +ROGERIO  Chest: Wall no abnormalities observed  Abdomen: normal bowel sounds and soft non-tender +OBESITY  Extremities: moves extremities well, no edema, no cyanosis and no redness +DJD  Skin: no bleeding, bruising or rash, turgor normal, color normal and no lesions noted  Neurologic: Alert, and oriented. No focal deficits    Labs:    WBC WBC   Date Value Ref Range Status   10/10/2021 10.10 3.40 - 10.80 10*3/mm3 Final   10/08/2021 13.00 (H) 3.40 - 10.80 10*3/mm3 Final      HGB Hemoglobin   Date Value Ref Range Status   10/10/2021 9.2 (L) 13.0 - 17.7 g/dL Final   10/08/2021 11.1 (L) 13.0 - 17.7 g/dL Final      HCT Hematocrit   Date Value Ref Range Status   10/10/2021 26.7 (L) 37.5 - 51.0 % Final   10/08/2021 33.1 (L) 37.5 - 51.0 % Final      Platlets No results found for: LABPLAT   MCV MCV   Date Value Ref Range Status   10/10/2021 88.6 79.0 - 97.0 fL Final   10/08/2021 87.9 79.0 - 97.0 fL Final          Sodium Sodium   Date Value Ref Range Status   10/10/2021 130 (L) 136 - 145 mmol/L Final   10/09/2021 130 (L) 136 - 145 mmol/L Final   10/08/2021 127 (L) 136 - 145 mmol/L Final   10/08/2021 127 (L) 136 - 145 mmol/L Final   10/08/2021 127 (L) 136 - 145 mmol/L Final   10/07/2021 129 (L) 136 - 145 mmol/L Final   10/07/2021 125 (L) 136 - 145 mmol/L Final      Potassium Potassium   Date Value Ref Range Status   10/10/2021 3.5 3.5 - 5.2 mmol/L Final   10/09/2021 3.5 3.5 - 5.2 mmol/L Final   10/08/2021 3.6 3.5 - 5.2 mmol/L Final     Comment:     Slight hemolysis detected by analyzer. Results may be affected.   10/08/2021 3.5 3.5 - 5.2 mmol/L Final     Comment:     Slight hemolysis detected by analyzer. Results may be affected.   10/08/2021 3.6 3.5 - 5.2 mmol/L Final   10/07/2021 3.7 3.5 - 5.2 mmol/L Final     Comment:     Slight hemolysis detected by analyzer. Results may be affected.   10/07/2021 3.6 3.5 - 5.2 mmol/L Final     Comment:      Slight hemolysis detected by analyzer. Results may be affected.      Chloride Chloride   Date Value Ref Range Status   10/10/2021 91 (L) 98 - 107 mmol/L Final   10/09/2021 92 (L) 98 - 107 mmol/L Final   10/08/2021 87 (L) 98 - 107 mmol/L Final   10/08/2021 89 (L) 98 - 107 mmol/L Final   10/08/2021 89 (L) 98 - 107 mmol/L Final   10/07/2021 91 (L) 98 - 107 mmol/L Final   10/07/2021 85 (L) 98 - 107 mmol/L Final      CO2 CO2   Date Value Ref Range Status   10/10/2021 30.0 (H) 22.0 - 29.0 mmol/L Final   10/09/2021 30.0 (H) 22.0 - 29.0 mmol/L Final   10/08/2021 26.0 22.0 - 29.0 mmol/L Final   10/08/2021 25.0 22.0 - 29.0 mmol/L Final   10/08/2021 26.0 22.0 - 29.0 mmol/L Final   10/07/2021 24.0 22.0 - 29.0 mmol/L Final   10/07/2021 26.0 22.0 - 29.0 mmol/L Final      BUN BUN   Date Value Ref Range Status   10/10/2021 15 8 - 23 mg/dL Final   10/09/2021 18 8 - 23 mg/dL Final   10/08/2021 21 8 - 23 mg/dL Final   10/08/2021 20 8 - 23 mg/dL Final   10/08/2021 22 8 - 23 mg/dL Final   10/07/2021 22 8 - 23 mg/dL Final   10/07/2021 22 8 - 23 mg/dL Final      Creatinine Creatinine   Date Value Ref Range Status   10/10/2021 0.59 (L) 0.76 - 1.27 mg/dL Final   10/09/2021 0.59 (L) 0.76 - 1.27 mg/dL Final   10/09/2021 0.62 (L) 0.76 - 1.27 mg/dL Final   10/08/2021 0.66 (L) 0.76 - 1.27 mg/dL Final   10/08/2021 0.62 (L) 0.76 - 1.27 mg/dL Final   10/08/2021 0.65 (L) 0.76 - 1.27 mg/dL Final   10/07/2021 0.66 (L) 0.76 - 1.27 mg/dL Final   10/07/2021 0.65 (L) 0.76 - 1.27 mg/dL Final      Calcium Calcium   Date Value Ref Range Status   10/10/2021 8.4 (L) 8.6 - 10.5 mg/dL Final   10/09/2021 8.5 (L) 8.6 - 10.5 mg/dL Final   10/08/2021 8.8 8.6 - 10.5 mg/dL Final   10/08/2021 8.6 8.6 - 10.5 mg/dL Final   10/08/2021 8.3 (L) 8.6 - 10.5 mg/dL Final   10/07/2021 8.3 (L) 8.6 - 10.5 mg/dL Final   10/07/2021 8.2 (L) 8.6 - 10.5 mg/dL Final      PO4 No components found for: PO4   Albumin Albumin   Date Value Ref Range Status   10/10/2021 2.70 (L) 3.50 -  5.20 g/dL Final   10/08/2021 2.50 (L) 3.50 - 5.20 g/dL Final      Magnesium Magnesium   Date Value Ref Range Status   10/10/2021 1.9 1.6 - 2.4 mg/dL Final   10/09/2021 2.2 1.6 - 2.4 mg/dL Final      Uric Acid No components found for: URIC ACID     Imaging Results (Last 72 Hours)     Procedure Component Value Units Date/Time    CT Cervical Spine Without Contrast [[id number]] Collected: 10/09/21 1659     Updated: 10/09/21 1705    Narrative:      DATE OF EXAM:  10/9/2021 4:27 PM     PROCEDURE:  CT CERVICAL SPINE WO CONTRAST-     INDICATIONS:   Neck pain, acute, known malignancy; A41.9-Sepsis, unspecified organism;  L22-Diaper dermatitis; J18.9-Pneumonia, unspecified organism;  E87.1-Sqft-oomlkguclk and hyponatremia; E87.6-Hypokalemia;  R53.1-Weakness; R91.8-Other nonspecific abnormal finding of lung field     COMPARISON:   No Comparisons Available     TECHNIQUE:  CT scan of the cervical spine without IV contrast. Automated exposure  control and iterative construction methods were used.      FINDINGS:  No acute fracture is identified. There are destructive changes involving  the right C1 lateral mass. There is minimal anterolisthesis of C4 on C5.  The vertebral body heights appear normal. There are moderate discogenic  changes at C5-C6 through T1-2. No high-grade spinal canal stenosis is  identified. There is uncovertebral hypertrophy and facet arthropathy at  several levels, resulting in up to severe neural foraminal stenosis on  the right at C3-4, on the right at C4-5, and bilaterally at C5-6. The  prevertebral soft tissues are unremarkable.       Impression:      1.Osseous destructive changes involving right C1 lateral mass,  concerning for metastatic disease.  2.Degenerative changes of cervical spine as described above.     Electronically Signed By-Bogdan Marquez MD On:10/9/2021 5:03 PM  This report was finalized on [id number] by  Bogdan Marquez MD.    CT Needle Biopsy Lung [[id number]] Collected: 10/07/21  1005     Updated: 10/07/21 1127    Narrative:      DATE OF EXAM:  10/7/2021 9:27 AM     PROCEDURE:  CT NEEDLE BIOPSY LUNG-     INDICATIONS:  Right lower lobe lung mass; A41.9-Sepsis, unspecified organism;  L22-Diaper dermatitis; J18.9-Pneumonia, unspecified organism;  E87.7-Kspm-dyvupqybdm and hyponatremia; E87.6-Hypokalemia;  R53.1-Weakness; R91.8-Other nonspecific abnormal finding of lung field     COMPARISON:  No Comparisons Available     FLUOROSCOPIC TIME:  CT fluoroscopy time 2.61 seconds     PHYSICIAN MONITORED CONSCIOUS SEDATION TIME:  15 minutes     CONTRAST MEDIA:  None     TECHNIQUE:   The patient's medications were reviewed prior to the procedure. The  procedure, risks and alternatives were discussed and informed written  consent was obtained. Maximal sterile barrier technique was utilized  throughout the procedure. The patient was placed in the right lateral  decubitus position and preliminary images were obtained. An appropriate  site was chosen for biopsy posteriorly over the right lower chest wall.  The skin was marked prepped and draped. IV conscious sedation was  administered consisting of Versed and fentanyl. The patient was  monitored by the IR nurse during the entire procedure. Utilizing maximal  sterile barrier technique and under local anesthesia a 17-gauge guide  needle was advanced incrementally into this area. The needle was  aspirated and no purulent fluid could be obtained. Multiple 18-gauge  core specimens were then obtained. These were reviewed by the  pathologist and felt to represent reactive fibrous tissue. Multiple  areas were sampled. These were placed in formalin. The needle was  removed and hemostasis achieved.          Impression:      Technically successful biopsy of the pleural-based mass at the right  lung base. Initial preliminary interpretation suggest dense fibrous  tissue. Final path results are pending. Postprocedure chest radiograph  is pending.     Electronically Signed  By-Aly Brandon MD On:10/7/2021 10:07 AM  This report was finalized on [id number] by  Aly Brandon MD.    XR Chest 1 View [[id number]] Collected: 10/07/21 1015     Updated: 10/07/21 1127    Narrative:      DATE OF EXAM:  10/7/2021 10:12 AM     PROCEDURE:  XR CHEST 1 VW-     INDICATIONS:  s/p right lung biopsy; A41.9-Sepsis, unspecified organism; L22-Diaper  dermatitis; J18.9-Pneumonia, unspecified organism; E87.8-Wphu-orfzocqtrv  and hyponatremia; E87.6-Hypokalemia; R53.1-Weakness; R91.8-Other  nonspecific abnormal finding of lung field       COMPARISON:  AP portable chest 10/5/2021. CT-guided needle biopsy of right lung Mass.  10/7/2021 at 9:44 AM.     TECHNIQUE:   Single radiographic view of the chest was obtained.     FINDINGS:  No pneumothorax is seen. Right lower lobe convex masslike density at the  right lateral costophrenic angle is again noted. Small right pleural  effusion is seen. Convex opacity in the medial left base corresponds to  focal eventration of the left hemidiaphragm, which is seen to better  advantage on prior CT chest study. Interstitial thickening or fibrotic  changes are thought to be present in both lungs. Heart size is normal.  Degenerative endplate spurring in the thoracic spine. Moderately  advanced degenerative changes of both shoulders.                   Impression:      No visible pneumothorax status post right lower lobe lung mass biopsy  earlier today.     Electronically Signed By-Opal Perales MD On:10/7/2021 10:17 AM  This report was finalized on [id number] by  Opal Perales MD.    US Liver [[id number]] Collected: 10/07/21 0948     Updated: 10/07/21 0952    Narrative:      DATE OF EXAM:  10/7/2021 9:29 AM     PROCEDURE:  US LIVER-     INDICATIONS:  Hepatic transaminase derangement; A41.9-Sepsis, unspecified organism;  L22-Diaper dermatitis; J18.9-Pneumonia, unspecified organism;  E87.9-Knsa-ehztndlflk and hyponatremia; E87.6-Hypokalemia;  R53.1-Weakness; R91.8-Other  nonspecific abnormal finding of lung field     COMPARISON:  CT abdomen and pelvis 10/5/2021.     TECHNIQUE:   Grayscale and color Doppler ultrasound evaluation of the limited abdomen  was performed.     FINDINGS:  The liver size is normal, 16.3 cm in length. The liver maintains a  normal echotexture. There are no focal liver lesions. The liver does not  appear grossly cirrhotic or steatotic. The main portal vein is patent.  Hepatic veins are patent. Common bile duct caliber is normal, 4 mm. No  intrahepatic biliary ductal dilation is identified.        Impression:      Normal hepatic ultrasound.     Electronically Signed By-Opal Perales MD On:10/7/2021 9:50 AM  This report was finalized on [id number] by  Opal Perales MD.          Results for orders placed during the hospital encounter of 10/05/21    XR Chest 1 View    Narrative  DATE OF EXAM:  10/7/2021 10:12 AM    PROCEDURE:  XR CHEST 1 VW-    INDICATIONS:  s/p right lung biopsy; A41.9-Sepsis, unspecified organism; L22-Diaper  dermatitis; J18.9-Pneumonia, unspecified organism; E87.0-Dtsc-ldhjjteszi  and hyponatremia; E87.6-Hypokalemia; R53.1-Weakness; R91.8-Other  nonspecific abnormal finding of lung field    COMPARISON:  AP portable chest 10/5/2021. CT-guided needle biopsy of right lung Mass.  10/7/2021 at 9:44 AM.    TECHNIQUE:  Single radiographic view of the chest was obtained.    FINDINGS:  No pneumothorax is seen. Right lower lobe convex masslike density at the  right lateral costophrenic angle is again noted. Small right pleural  effusion is seen. Convex opacity in the medial left base corresponds to  focal eventration of the left hemidiaphragm, which is seen to better  advantage on prior CT chest study. Interstitial thickening or fibrotic  changes are thought to be present in both lungs. Heart size is normal.  Degenerative endplate spurring in the thoracic spine. Moderately  advanced degenerative changes of both shoulders.    Impression  No visible  pneumothorax status post right lower lobe lung mass biopsy  earlier today.    Electronically Signed By-Opal Perales MD On:10/7/2021 10:17 AM  This report was finalized on [id number] by  Opal Perales MD.      XR Chest 1 View    Narrative  Examination: XR CHEST 1 VW-    Date of Exam: 10/5/2021 8:10 PM    Indication: Weakness.    Comparison: 05/20/2015    Technique: 1 view of the chest    FINDINGS:  The heart size is within normal. There is a lobulated opacity in the  inferior lateral right chest that measures about 5.5 cm; the lower  margin of this is obscured. There is some adjacent pleural thickening or  fluid in the inferior lateral right chest. These findings have developed  since the 2015 exam. There may be some minor patchy airspace opacity in  the lateral aspect of the left midlung. There is aortic calcification.  No significant bone abnormality.    Impression  1. There is an abnormality in the right lower chest. Part of this has a  masslike configuration. It may be due to parenchymal parenchymal  disease, pleural disease, or both. A lung mass is possible. Further  evaluation with chest CT with contrast is recommended.  2. Question some minor opacity in the lateral left midlung which may be  due to pneumonia.    Electronically Signed By-Margarita Cooper MD On:10/5/2021 9:06 PM  This report was finalized on [id number] by  Margarita Cooper MD.      Results for orders placed in visit on 04/14/21    SCANNED - IMAGING            ASSESSMENT / PLAN      Sepsis (HCC)    1. HYPONATREMIA/SIADH-------Stable and in safe range s/p Samsca. Follow with po fluid restriction alone at 1200 cc/day. No neuro sx. Off thiazides    2. PNA/RIGHT LUNG MASS------per Pulmonary and Hem/Onc    3. BENIGN ESSENTIAL HTN-----BP okay. BP meds on hold    4. OA/DJD    5. HYPERLIPIDEMIA------Hold Statin given elevated LFTs    6. ANEMIA------IV iron for LASHAY    7. DVT PROPHYLAXIS-----Lovenox    8. HYPOKALEMIA-----Replaced    9.  HYPOMAGNESEMIA-----Replaced    10. ELEVATED LFTS/TRANSAMINASES    11. 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 at less    Generalized weakness and lethargy and fatigue  Hyponatremia possible related to lung mass  COPD  Possible pneumonia with leukocytosis   hypokalemia  UTI     Hepatic steatosis  Hepatic transaminase derangement  -Elevated liver enzymes     Recommendations:    Oncology following  Neurosurgery has been consulted for metastasis to cervical spine atlas     Bronchodilator\inhaled corticosteroid  DVT prophylaxis Lovenox    Will need total body PET scan as outpatient              LOS: 4 days     Subjective         Objective     Vital signs for last 24 hours:  Vitals:    10/09/21 2052 10/10/21 0048 10/10/21 0237 10/10/21 0640   BP: 107/59 112/65 119/57 109/61   BP Location: Left arm Left arm Left arm Left arm   Patient Position: Lying Lying Lying Lying   Pulse:  109 89 88   Resp: 13 18 18 13   Temp: 98.4 °F (36.9 °C) 97.7 °F (36.5 °C) 98 °F (36.7 °C) 97.6 °F (36.4 °C)   TempSrc: Oral Oral Oral Axillary   SpO2:   98% 98%   Weight:    84.1 kg (185 lb 4.8 oz)   Height:           Intake/Output last 3 shifts:  I/O last 3 completed shifts:  In: 720 [P.O.:720]  Out: 1800 [Urine:1800]  Intake/Output this shift:  No intake/output data recorded.      Radiology  Imaging Results (Last 24 Hours)     Procedure Component Value Units Date/Time    CT Cervical Spine Without Contrast [[id number]] Collected: 10/09/21 1659     Updated: 10/09/21 1705    Narrative:      DATE OF EXAM:  10/9/2021 4:27 PM      PROCEDURE:  CT CERVICAL SPINE WO CONTRAST-     INDICATIONS:   Neck pain, acute, known malignancy; A41.9-Sepsis, unspecified organism;  L22-Diaper dermatitis; J18.9-Pneumonia, unspecified organism;  E87.9-Enzm-fnxcwfaszo and hyponatremia; E87.6-Hypokalemia;  R53.1-Weakness; R91.8-Other nonspecific abnormal finding of lung field     COMPARISON:   No Comparisons Available     TECHNIQUE:  CT scan of the cervical spine without IV contrast. Automated exposure  control and iterative construction methods were used.      FINDINGS:  No acute fracture is identified. There are destructive changes involving  the right C1 lateral mass. There is minimal anterolisthesis of C4 on C5.  The vertebral body heights appear normal. There are moderate discogenic  changes at C5-C6 through T1-2. No high-grade spinal canal stenosis is  identified. There is uncovertebral hypertrophy and facet arthropathy at  several levels, resulting in up to severe neural foraminal stenosis on  the right at C3-4, on the right at C4-5, and bilaterally at C5-6. The  prevertebral soft tissues are unremarkable.       Impression:      1.Osseous destructive changes involving right C1 lateral mass,  concerning for metastatic disease.  2.Degenerative changes of cervical spine as described above.     Electronically Signed By-Bogdan Marquez MD On:10/9/2021 5:03 PM  This report was finalized on [id number] by  Bogdan Marquez MD.          Labs:  Results from last 7 days   Lab Units 10/10/21  0442   WBC 10*3/mm3 10.10   HEMOGLOBIN g/dL 9.2*   HEMATOCRIT % 26.7*   PLATELETS 10*3/mm3 363     Results from last 7 days   Lab Units 10/10/21  0442   SODIUM mmol/L 130*   POTASSIUM mmol/L 3.5   CHLORIDE mmol/L 91*   CO2 mmol/L 30.0*   BUN mg/dL 15   CREATININE mg/dL 0.59*   CALCIUM mg/dL 8.4*   BILIRUBIN mg/dL 0.5   ALK PHOS U/L 68   ALT (SGPT) U/L 412*   AST (SGOT) U/L 236*   GLUCOSE mg/dL 87         Results from last 7 days   Lab Units 10/10/21  0442 10/08/21  0533  10/07/21  0547   ALBUMIN g/dL 2.70* 2.50* 2.60*     Results from last 7 days   Lab Units 10/05/21  1951   CK TOTAL U/L 97   TROPONIN T ng/mL <0.010         Results from last 7 days   Lab Units 10/10/21  0442   MAGNESIUM mg/dL 1.9     Results from last 7 days   Lab Units 10/05/21  1951   INR  1.18*   APTT seconds 30.8     Results from last 7 days   Lab Units 10/05/21  1951   TSH uIU/mL 1.420           Meds:   SCHEDULE  aspirin, 81 mg, Oral, Daily  atorvastatin, 20 mg, Oral, Nightly  budesonide, 0.5 mg, Nebulization, BID - RT  cefTRIAXone, 1 g, Intravenous, Q24H  dilTIAZem CD, 180 mg, Oral, Q24H  enoxaparin, 40 mg, Subcutaneous, Daily  ferric gluconate, 125 mg, Intravenous, Daily  ipratropium-albuterol, 3 mL, Nebulization, 4x Daily - RT      Infusions     PRNs  HYDROcodone-acetaminophen  •  magnesium sulfate **OR** magnesium sulfate **OR** magnesium sulfate  •  potassium chloride **OR** potassium chloride **OR** potassium chloride  •  [COMPLETED] Insert peripheral IV **AND** sodium chloride  •  tiZANidine    Physical Exam:  Physical Exam  Vitals reviewed.   Pulmonary:      Breath sounds: Rhonchi present.   Skin:     General: Skin is warm and dry.   Neurological:      Mental Status: He is alert.         ROS  Review of Systems   HENT:        Neck pain   Respiratory: Positive for cough and shortness of breath.    Musculoskeletal: Positive for arthralgias.       Reviewed the patient's new clinical results    Electronically signed by DAVID Quigley    Electronically signed by Cassandra Cardona APRN at 10/10/21 1230     Kennedi Cárdenas APRN at 10/10/21 6548               LOS: 4 days   Patient Care Team:  Herve Baez MD as PCP - General (Family Medicine)  Uche Mojica MD as Consulting Physician (Nephrology)  Estefania Sanchez MD as Consulting Physician (Hematology and Oncology)    Subjective:  Neck pain now not controlled with oral pain meds, constipation    Objective:   Appears Weak      Review of Systems:    Review of Systems   Constitutional: Positive for activity change.   Neurological: Positive for weakness.   Positive for neck pain and headaches        Vital Signs  Temp:  [97.3 °F (36.3 °C)-98.4 °F (36.9 °C)] 97.3 °F (36.3 °C)  Heart Rate:  [] 101  Resp:  [13-19] 18  BP: (107-119)/(57-69) 119/69    Physical Exam:  Physical Exam  Constitutional:       General: He is not in acute distress.  Cardiovascular:      Rate and Rhythm: Rhythm irregular.      Heart sounds: Normal heart sounds.   Pulmonary:      Breath sounds: Normal breath sounds.   Skin:     General: Skin is warm.      Pt in neck brace, neck pain with movement    Radiology:  CT Head Without Contrast    Result Date: 10/5/2021  No acute intracranial abnormality. Electronically signed by:  Lenny Mckee  10/5/2021 9:37 PM    CT Chest With Contrast Diagnostic    Result Date: 10/5/2021  1. There is a large low-density mass in the right lung base measuring 9.5 x 5.3 cm. This is favored to represent a neoplastic process which must be considered and excluded. This lesion would be amenable to CT-guided biopsy as determined clinically. Alternatively, a PET/CT study could be obtained for better characterization. Follow-up is recommended. 2. Reticulonodular type infiltrates in the right upper lobe with additional nodular type groundglass and confluent groundglass airspace opacities in the left upper lobe and lingula. Findings are most likely related to infectious bronchopneumonia, possibly related to viral pneumonia and/or aspiration. COVID pneumonia should be considered. 3. Element of interlobular septal thickening may represent early pulmonary edema. 4. Emphysema. 5. Advanced coronary artery calcifications. 6. Enlarged subcarinal lymph node measuring 1.8 cm in short axis. Metastatic disease must be excluded. 7. Hepatic steatosis. The liver also demonstrates a very subtle micronodular contour which may represent liver disease or early cirrhosis. Correlation  with hepatic function is recommended. 8. Diverticulosis. 9. Severe atherosclerotic disease. 10. Additional incidental and chronic findings as above. *[employer] GUIDELINES: Low risk patient: <6mm - Low Risk, no further f/u >6-8mm - low dose CT 6-12 mo, then 2nd f/u CT at 18-24mo >8mm - low dose CT 3,9,24mo OR PET/CT OR Biopsy High Risk Patient: <6mm - optional CT at 12 mo >6-8mm - low dose CT 6-12 mo, then 2nd f/u CT at 18-24mo >8mm - low dose CT at 3 mo, OR PET/CT OR Biopsy MULTIPLE NODULES: Low Risk Patient: <6mm - Low Risk, no further f/u >6-8mm - low dose CT 3-6 mo, then 2nd f/u CT at 18-24mo >8mm - low dose CT 3-6 mo, then 2nd f/u CT at 18-24mo High Risk Patient: <6mm - High risk, multiple nodules, optional CT at 12 mo >6-8mm - low dose CT 3-6 mo, then 2nd f/u CT at 18-24mo >8mm - low dose CT 3-6 mo, then 2nd f/u CT at 18-24mo Slot 67 Electronically signed by:  Arcenio Vanegas M.D.  10/5/2021 9:30 PM    CT Cervical Spine Without Contrast    Result Date: 10/9/2021  1.Osseous destructive changes involving right C1 lateral mass, concerning for metastatic disease. 2.Degenerative changes of cervical spine as described above.  Electronically Signed By-Bogdan Marquez MD On:10/9/2021 5:03 PM This report was finalized on [id number] by  Bogdan Marquez MD.    CT Abdomen Pelvis With Contrast    Result Date: 10/5/2021  1. There is a large low-density mass in the right lung base measuring 9.5 x 5.3 cm. This is favored to represent a neoplastic process which must be considered and excluded. This lesion would be amenable to CT-guided biopsy as determined clinically. Alternatively, a PET/CT study could be obtained for better characterization. Follow-up is recommended. 2. Reticulonodular type infiltrates in the right upper lobe with additional nodular type groundglass and confluent groundglass airspace opacities in the left upper lobe and lingula. Findings are most likely related to infectious bronchopneumonia,  possibly related to viral pneumonia and/or aspiration. COVID pneumonia should be considered. 3. Element of interlobular septal thickening may represent early pulmonary edema. 4. Emphysema. 5. Advanced coronary artery calcifications. 6. Enlarged subcarinal lymph node measuring 1.8 cm in short axis. Metastatic disease must be excluded. 7. Hepatic steatosis. The liver also demonstrates a very subtle micronodular contour which may represent liver disease or early cirrhosis. Correlation with hepatic function is recommended. 8. Diverticulosis. 9. Severe atherosclerotic disease. 10. Additional incidental and chronic findings as above. *[employer] GUIDELINES: Low risk patient: <6mm - Low Risk, no further f/u >6-8mm - low dose CT 6-12 mo, then 2nd f/u CT at 18-24mo >8mm - low dose CT 3,9,24mo OR PET/CT OR Biopsy High Risk Patient: <6mm - optional CT at 12 mo >6-8mm - low dose CT 6-12 mo, then 2nd f/u CT at 18-24mo >8mm - low dose CT at 3 mo, OR PET/CT OR Biopsy MULTIPLE NODULES: Low Risk Patient: <6mm - Low Risk, no further f/u >6-8mm - low dose CT 3-6 mo, then 2nd f/u CT at 18-24mo >8mm - low dose CT 3-6 mo, then 2nd f/u CT at 18-24mo High Risk Patient: <6mm - High risk, multiple nodules, optional CT at 12 mo >6-8mm - low dose CT 3-6 mo, then 2nd f/u CT at 18-24mo >8mm - low dose CT 3-6 mo, then 2nd f/u CT at 18-24mo Slot 67 Electronically signed by:  Arcenio Vanegas M.D.  10/5/2021 9:30 PM    US Liver    Result Date: 10/7/2021  Normal hepatic ultrasound.  Electronically Signed By-Opal Perales MD On:10/7/2021 9:50 AM This report was finalized on [id number] by  Opal Perales MD.    XR Chest 1 View    Result Date: 10/7/2021  No visible pneumothorax status post right lower lobe lung mass biopsy earlier today.  Electronically Signed By-Opal Perales MD On:10/7/2021 10:17 AM This report was finalized on [id number] by  Opal Perales MD.    XR Chest 1 View    Result Date: 10/5/2021  1. There is an abnormality  in the right lower chest. Part of this has a masslike configuration. It may be due to parenchymal parenchymal disease, pleural disease, or both. A lung mass is possible. Further evaluation with chest CT with contrast is recommended. 2. Question some minor opacity in the lateral left midlung which may be due to pneumonia.  Electronically Signed By-Margarita Cooper MD On:10/5/2021 9:06 PM This report was finalized on [id number] by  Margarita Cooper MD.    CT Needle Biopsy Lung    Result Date: 10/7/2021  Technically successful biopsy of the pleural-based mass at the right lung base. Initial preliminary interpretation suggest dense fibrous tissue. Final path results are pending. Postprocedure chest radiograph is pending.  Electronically Signed By-Aly Brandon MD On:10/7/2021 10:07 AM This report was finalized on [id number] by  Aly Brandon MD.         Results Review:     I reviewed the patient's new clinical results.  I reviewed the patient's new imaging results and agree with the interpretation.    Medication Review:   Scheduled Meds:aspirin, 81 mg, Oral, Daily  budesonide, 0.5 mg, Nebulization, BID - RT  fentaNYL, 1 patch, Transdermal, Q72H   And  [START ON 10/11/2021] Check Fentanyl Patch Placement, 1 each, Does not apply, Q12H  dilTIAZem CD, 180 mg, Oral, Q24H  docusate sodium, 100 mg, Oral, BID  enoxaparin, 40 mg, Subcutaneous, Daily  ipratropium-albuterol, 3 mL, Nebulization, 4x Daily - RT      Continuous Infusions:   PRN Meds:.bisacodyl  •  HYDROcodone-acetaminophen  •  magnesium hydroxide  •  magnesium sulfate **OR** magnesium sulfate **OR** magnesium sulfate  •  potassium chloride **OR** potassium chloride **OR** potassium chloride  •  [COMPLETED] Insert peripheral IV **AND** sodium chloride  •  tiZANidine    Labs:    CBC    Results from last 7 days   Lab Units 10/10/21  0442 10/08/21  0533 10/07/21  0546 10/06/21  0906 10/05/21  1951   WBC 10*3/mm3 10.10 13.00* 15.60* 19.60* 14.20*   HEMOGLOBIN g/dL 9.2* 11.1*  11.7* 12.6* 11.7*   PLATELETS 10*3/mm3 363 300 317 316 392     BMP   Results from last 7 days   Lab Units 10/10/21  0442 10/09/21  0531 10/09/21  0117 10/08/21  1205 10/08/21  0533 10/08/21  0042 10/07/21  1902 10/07/21  1252 10/07/21  1252 10/07/21  0547 10/06/21  0906 10/05/21  1951 10/05/21  1951   SODIUM mmol/L 130*  --  130* 127* 127* 127* 129*  --  125*   < > 123*   < > 120*   POTASSIUM mmol/L 3.5  --  3.5 3.6 3.5 3.6 3.7  --  3.6   < > 3.1*   < > 2.5*   CHLORIDE mmol/L 91*  --  92* 87* 89* 89* 91*  --  85*   < > 82*   < > 75*   CO2 mmol/L 30.0*  --  30.0* 26.0 25.0 26.0 24.0  --  26.0   < > 25.0   < > 21.0*   BUN mg/dL 15  --  18 21 20 22 22  --  22   < > 14   < > 16   CREATININE mg/dL 0.59* 0.59* 0.62* 0.66* 0.62* 0.65* 0.66*   < > 0.65*   < > 0.67*   < > 0.96   GLUCOSE mg/dL 87  --  111* 153* 103* 128* 145*  --  103*   < > 101*   < > 178*   MAGNESIUM mg/dL 1.9 2.2  --   --   --   --   --   --   --   --  1.5*  --  1.5*   PHOSPHORUS mg/dL 2.3*  --   --   --   --   --   --   --   --   --   --   --   --     < > = values in this interval not displayed.     Cr Clearance Estimated Creatinine Clearance: 81.8 mL/min (A) (by C-G formula based on SCr of 0.59 mg/dL (L)).  Coag   Results from last 7 days   Lab Units 10/05/21  1951   INR  1.18*   APTT seconds 30.8     HbA1C No results found for: HGBA1C  Blood Glucose No results found for: POCGLU  Infection   Results from last 7 days   Lab Units 10/05/21  2001 10/05/21  1957 10/05/21  1951   BLOODCX  No growth at 4 days  --  No growth at 4 days   URINECX   --  No growth  --      CMP   Results from last 7 days   Lab Units 10/10/21  0442 10/09/21  0531 10/09/21  0117 10/08/21  1205 10/08/21  0533 10/08/21  0042 10/07/21  1902 10/07/21  1252 10/07/21  1252 10/07/21  0547 10/07/21  0547 10/06/21  0906 10/06/21  0906 10/05/21  1951 10/05/21  1951   SODIUM mmol/L 130*  --  130* 127* 127* 127* 129*  --  125*   < > 124*   < > 123*   < > 120*   POTASSIUM mmol/L 3.5  --  3.5 3.6  3.5 3.6 3.7  --  3.6   < > 3.7   < > 3.1*   < > 2.5*   CHLORIDE mmol/L 91*  --  92* 87* 89* 89* 91*  --  85*   < > 87*   < > 82*   < > 75*   CO2 mmol/L 30.0*  --  30.0* 26.0 25.0 26.0 24.0  --  26.0   < > 24.0   < > 25.0   < > 21.0*   BUN mg/dL 15  --  18 21 20 22 22  --  22   < > 22   < > 14   < > 16   CREATININE mg/dL 0.59* 0.59* 0.62* 0.66* 0.62* 0.65* 0.66*   < > 0.65*   < > 0.65*   < > 0.67*   < > 0.96   GLUCOSE mg/dL 87  --  111* 153* 103* 128* 145*  --  103*   < > 90   < > 101*   < > 178*   ALBUMIN g/dL 2.70*  --   --   --  2.50*  --   --   --   --   --  2.60*  --  3.00*  --  3.30*   BILIRUBIN mg/dL 0.5  --   --   --  0.9  --   --   --   --   --  0.9  --  0.8  --  0.6   ALK PHOS U/L 68  --   --   --  74  --   --   --   --   --  77  --  88  --  83   AST (SGOT) U/L 236*  --   --   --  965*  --   --   --   --   --  814*  --  260*  --  155*   ALT (SGPT) U/L 412*  --   --   --  881*  --   --   --   --   --  592*  --  177*  --  92*   LIPASE U/L  --   --   --   --   --   --   --   --   --   --   --   --   --   --  47    < > = values in this interval not displayed.     UA    Results from last 7 days   Lab Units 10/05/21  1957   NITRITE UA  Negative   WBC UA /HPF 31-50*   BACTERIA UA /HPF None Seen   SQUAM EPITHEL UA /HPF 7-12*   URINECX  No growth     Radiology(recent) CT Cervical Spine Without Contrast    Result Date: 10/9/2021  1.Osseous destructive changes involving right C1 lateral mass, concerning for metastatic disease. 2.Degenerative changes of cervical spine as described above.  Electronically Signed By-Bogdan Marquez MD On:10/9/2021 5:03 PM This report was finalized on [id number] by  Bogdan Marquez MD.     Assessment:    Acute mental status changes secondary to acute metabolic encephalopathy secondary to community-acquired pneumonia  Acute sepsis secondary to the above  Acute right lower lobe lung mass  Status post ultrasound-guided biopsy of right lower lobe mass 10/7/2021 which revealed invasive  poorly diff squamous cell carcinoma  Panlobular COPD with emphysema  BPH with LUTS  Chronic mucopurulent bronchitis  Hepatic steatosis  Hepatic transaminase derangement  Atherosclerotic heart disease of native coronary arteries with angina pectoris  Coronary calcifications  Chronic kidney disease stage II  Microscopic hematuria  Hypertension associated chronic kidney disease stage II  Hypokalemia likely secondary to the above  Hyponatremia secondary to SIADH  DDD L/S  Myofascial pain syndrome  Narcotic dependency    Plan:  Add Duragesic patch-12.5 as pts neck pain may be due to bone mets from his lung cancer. Add MOM, suppository and stool softners for constipation  Needs rehab at [place]  Oncology is consulting as well as pulmonology        DAVID Trevizo  10/10/21  12:37 EDT          Electronically signed by Kennedi Cárdenas APRN at 10/10/21 1242     Estefania Sanchez MD at 10/09/21 1002          Hematology/Oncology Inpatient Progress Note    PATIENT NAME: Prabhjot Roldan  : 1935  MRN: [id number]    CHIEF COMPLAINT: Lethargy and altered mental status    HISTORY OF PRESENT ILLNESS:    86 y.o. male [facility] with a complaint of altered mental status.  He was noted to be lethargic, hypotensive upon presentation.  Electrolyte abnormalities and community-acquired pneumonia is suspected and he was admitted.  Incidentally CT of the chest showed right lung mass lower lobe.     10/06/21  Hematology/Oncology was consulted for right lung mass.  His daughter was there.  Is having diminished appetite.  He is not able to eat due to lack of dentures since he lost left home.     Past Medical History:  #1 hypertension  2.  Hyperlipidemia  3.  Arthritis  Surgical History:  Hernia repair  Social History:  He was ex-smoker.  He quit smoking 40 years ago.  There is no history of alcohol abuse.  Family History:  1.  Mother had carcinoma.  She also suffered from diabetes  mellitus.  2.  Father had prostate carcinoma.  He  from myocardial infarction.  Allergies:  No known allergies.    INTERVAL HISTORY:  • 10/7/2021 CT-guided lung biopsy was done.  • 10/8/2021 path report is pending.  WBC 13, hemoglobin 11.1, platelet count 300,000.   (1-40)  (1-41) total bilirubin 0.9 (0-1.2)  • 10/9/2021 right lung biopsy showed invasive squamous cell carcinoma, CK 5/6+, TTF-1 negative  •     Subjective   Patient continues to feel fatigued and sleeps most of the time    ROS:  Review of Systems   Constitutional: Positive for activity change and appetite change. Negative for chills, fatigue, fever and unexpected weight change.   HENT: Negative for congestion, dental problem, hearing loss, mouth sores, nosebleeds, sore throat and trouble swallowing.    Eyes: Negative for photophobia and visual disturbance.   Respiratory: Negative for cough and shortness of breath.    Cardiovascular: Negative for chest pain, palpitations and leg swelling.   Gastrointestinal: Negative for abdominal distention, abdominal pain, blood in stool, constipation, diarrhea, nausea and vomiting.   Endocrine: Negative for cold intolerance and heat intolerance.   Genitourinary: Negative for decreased urine volume, difficulty urinating, frequency, hematuria and urgency.   Musculoskeletal: Negative for arthralgias, back pain and gait problem.   Skin: Negative for rash and wound.   Neurological: Negative for dizziness, tremors, weakness, light-headedness, numbness and headaches.   Hematological: Negative for adenopathy. Does not bruise/bleed easily.   Psychiatric/Behavioral: Positive for confusion. Negative for hallucinations. The patient is not nervous/anxious.    All other systems reviewed and are negative.       MEDICATIONS:    Scheduled Meds:  aspirin, 81 mg, Oral, Daily  atorvastatin, 20 mg, Oral, Nightly  budesonide, 0.5 mg, Nebulization, BID - RT  cefTRIAXone, 1 g, Intravenous, Q24H  dilTIAZem CD, 180 mg, Oral,  "Q24H  enoxaparin, 40 mg, Subcutaneous, Daily  ferric gluconate, 125 mg, Intravenous, Daily  ipratropium-albuterol, 3 mL, Nebulization, 4x Daily - RT       Continuous Infusions:      PRN Meds:  HYDROcodone-acetaminophen  •  magnesium sulfate **OR** magnesium sulfate **OR** magnesium sulfate  •  potassium chloride **OR** potassium chloride **OR** potassium chloride  •  [COMPLETED] Insert peripheral IV **AND** sodium chloride  •  tiZANidine     ALLERGIES:  No Known Allergies    Objective    VITALS:   /55 (BP Location: Left arm, Patient Position: Lying)   Pulse 77   Temp 97.8 °F (36.6 °C) (Oral)   Resp 13   Ht 175.3 cm (69\")   Wt 86.5 kg (190 lb 11.2 oz)   SpO2 93%   BMI 28.16 kg/m²     PHYSICAL EXAM:  Physical Exam  Vitals and nursing note reviewed.   Constitutional:       General: He is not in acute distress.     Appearance: Normal appearance. He is well-developed. He is not diaphoretic.   HENT:      Head: Normocephalic and atraumatic.      Nose: Nose normal.      Mouth/Throat:      Mouth: Mucous membranes are moist.      Pharynx: Oropharynx is clear. No oropharyngeal exudate or posterior oropharyngeal erythema.   Eyes:      General: No scleral icterus.     Extraocular Movements: Extraocular movements intact.      Conjunctiva/sclera: Conjunctivae normal.      Pupils: Pupils are equal, round, and reactive to light.   Cardiovascular:      Rate and Rhythm: Normal rate and regular rhythm.      Heart sounds: Normal heart sounds. No murmur heard.      Pulmonary:      Effort: Pulmonary effort is normal. No respiratory distress.      Breath sounds: No wheezing or rales.      Comments: There is a diminished breath sounds in the right infrascapular area  Chest:   Breasts:      Right: No supraclavicular adenopathy.      Left: No supraclavicular adenopathy.       Abdominal:      General: Bowel sounds are normal. There is no distension.      Palpations: Abdomen is soft. There is no mass.      Tenderness: There is no " abdominal tenderness. There is no guarding.   Genitourinary:     Comments: Deferred   Musculoskeletal:         General: No swelling, tenderness or deformity. Normal range of motion.      Cervical back: Normal range of motion and neck supple.      Right lower leg: No edema.      Left lower leg: No edema.   Lymphadenopathy:      Cervical: No cervical adenopathy.      Upper Body:      Right upper body: No supraclavicular adenopathy.      Left upper body: No supraclavicular adenopathy.   Skin:     General: Skin is warm and dry.      Coloration: Skin is not pale.      Findings: No bruising, erythema or rash.   Neurological:      General: No focal deficit present.      Mental Status: He is alert and oriented to person, place, and time.      Coordination: Coordination normal.   Psychiatric:         Mood and Affect: Mood normal.         Behavior: Behavior normal.         Thought Content: Thought content normal.           RECENT LABS:  Lab Results (last 24 hours)     Procedure Component Value Units Date/Time    Hepatitis Diagnostic Profile [[id number]] Collected: 10/08/21 1205    Specimen: Blood Updated: 10/09/21 0816     Hep A IgM Negative     Hepatitis B Surface Ag Negative     Hep B Core IgM Negative    Narrative:      Performed at:  [address] [place] [address]  [id number]  : Abdirizak Gill PhD, Phone:  [phone]    Magnesium [[id number]]  (Normal) Collected: 10/09/21 0531    Specimen: Blood Updated: 10/09/21 0635     Magnesium 2.2 mg/dL     Creatinine, Serum [[id number]]  (Abnormal) Collected: 10/09/21 0531    Specimen: Blood Updated: 10/09/21 0626     Creatinine 0.59 mg/dL      eGFR Non African Amer 131 mL/min/1.73     Narrative:      GFR Normal >60  Chronic Kidney Disease <60  Kidney Failure <15      Basic Metabolic Panel [[id number]]  (Abnormal) Collected: 10/09/21 0117    Specimen: Blood Updated: 10/09/21 0414     Glucose 111 mg/dL      BUN 18 mg/dL      Creatinine 0.62 mg/dL       Sodium 130 mmol/L      Potassium 3.5 mmol/L      Chloride 92 mmol/L      CO2 30.0 mmol/L      Calcium 8.5 mg/dL      eGFR Non African Amer 124 mL/min/1.73      BUN/Creatinine Ratio 29.0     Anion Gap 8.0 mmol/L     Narrative:      GFR Normal >60  Chronic Kidney Disease <60  Kidney Failure <15      Blood Culture - Blood, Arm, Right [[id number]] Collected: 10/05/21 2001    Specimen: Blood from Arm, Right Updated: 10/08/21 2015     Blood Culture No growth at 3 days    Blood Culture - Blood, Arm, Left [[id number]] Collected: 10/05/21 1951    Specimen: Blood from Arm, Left Updated: 10/08/21 2015     Blood Culture No growth at 3 days    Folate [[id number]]  (Normal) Collected: 10/08/21 1205    Specimen: Blood Updated: 10/08/21 2010     Folate 13.40 ng/mL     Narrative:      Results may be falsely increased if patient taking Biotin.      Vitamin B12 [[id number]]  (Abnormal) Collected: 10/08/21 1205    Specimen: Blood Updated: 10/08/21 2010     Vitamin B-12 >2,000 pg/mL     Narrative:      Results may be falsely increased if patient taking Biotin.      Immunofixation, Serum [[id number]]  (Abnormal) Collected: 10/07/21 0547    Specimen: Blood Updated: 10/08/21 1412     Immunofixation Result, Serum Comment:     Comment: Presence of monoclonal protein is unclear at this time. Suggest  repeat in 3 to 6 months if clinically indicated.        IgG 1703 mg/dL      IgA 558 mg/dL      IgM 68 mg/dL     Narrative:      Performed at:  [address] [place] [address]  [id number]  : Abdirizak Gill PhD, Phone:  [phone]    Ferritin [[id number]]  (Abnormal) Collected: 10/08/21 1205    Specimen: Blood Updated: 10/08/21 1332     Ferritin 951.70 ng/mL     Narrative:      Results may be falsely decreased if patient taking Biotin.      Basic Metabolic Panel [[id number]]  (Abnormal) Collected: 10/08/21 1205    Specimen: Blood Updated: 10/08/21 1327     Glucose 153 mg/dL      BUN 21 mg/dL      Creatinine 0.66 mg/dL       Sodium 127 mmol/L      Potassium 3.6 mmol/L      Comment: Slight hemolysis detected by analyzer. Results may be affected.        Chloride 87 mmol/L      CO2 26.0 mmol/L      Calcium 8.8 mg/dL      eGFR Non African Amer 115 mL/min/1.73      BUN/Creatinine Ratio 31.8     Anion Gap 14.0 mmol/L     Narrative:      GFR Normal >60  Chronic Kidney Disease <60  Kidney Failure <15      Iron Profile [[id number]]  (Abnormal) Collected: 10/08/21 1205    Specimen: Blood Updated: 10/08/21 1327     Iron 29 mcg/dL      Iron Saturation 9 %      Transferrin 216 mg/dL      TIBC 322 mcg/dL     Immunoglobulin Free LT Chains Blood [[id number]] Collected: 10/08/21 1205    Specimen: Blood Updated: 10/08/21 1258    Immunofixation, Serum [[id number]] Collected: 10/08/21 1205    Specimen: Blood Updated: 10/08/21 1257    Tissue Pathology Exam [[id number]] Collected: 10/07/21 0952    Specimen: Tissue from Lung, Right Lower Lobe Updated: 10/08/21 1227     Case Report --     Surgical Pathology Report                         Case: [id number]                                  Authorizing Provider:  Uzma Ochoa MD             Collected:           10/07/2021 09:52 AM          Ordering Location:     [facility]       Received:            10/07/2021 10:16 AM                                 EMERGENCY DEPARTMENT                                                         Pathologist:           Bogdan Cline MD                                                            Intraop:               Chico Su MD                                                             Specimen:    Lung, Right Lower Lobe                                                                      Final Diagnosis --     Mass, right lower lobe, biopsies:    Invasive poorly differentiated squamous cell carcinoma     See comment    NEDA/tkd        Comment --     Immunohistochemical stains were applied with valid controls and are reported as follows: The tumor is positive for  "cytokeratin 5/6, p40 and p63. The tumor is negative for cytokeratin 7, TTF-1 and napsin A. The immunohistochemical staining pattern supports the rendered diagnosis.     NEDA/tkd        Intraoperative Consultation --     TP DX#1: Atypical.     TP DX#2: Atypical.     TP DX#3: Atypical.     TP DX#4: Atypical.     Chico Su MD       Gross Description --     Received initially without fixative for immediate evaluation touch prep diagnosis and then placed in a container of formalin designated \"Right lower lobe\" are multiple whitish core biopsies of soft tissue measuring 0.1 cm in diameter and range up to 1.6 cm in length, submitted in one cassette.      NEDA/sms             PENDING RESULTS: Right lung biopsy report is pending    IMAGING REVIEWED:  XR Chest 1 View    Result Date: 10/7/2021  No visible pneumothorax status post right lower lobe lung mass biopsy earlier today.  Electronically Signed By-Opal Perales MD On:10/7/2021 10:17 AM This report was finalized on [id number] by  Opal Perales MD.      I have reviewed the patient's labs, imaging, reports, and other clinician documentation.    Assessment/Plan   ASSESSMENT:  1.Right lung lower lobe mass, characterized as hypodense lesion: Patient had a biopsy the biopsy report is pending.  Attempted aspiration of this mass was not successful and it suggestive of solid or thick purulent effusion.  He is currently receiving antibiotics with ceftriaxone.  Final Diagnosis   Mass, right lower lobe, biopsies:    Invasive poorly differentiated squamous cell carcinoma     See comment        ,7   Comment    Immunohistochemical stains were applied with valid controls and are reported as follows: The tumor is positive for cytokeratin 5/6, p40 and p63. The tumor is negative for cytokeratin 7, TTF-1 and napsin A. The immunohistochemical staining pattern supports the rendered diagnosis   No family members with him at this time.  I will diebolt this information in the presence of " family members.  2.Anemia: He has normochromic normocytic anemia.  Iron profile is inconclusive with elevated ferritin and iron saturation 9%.  B12 and folic acid are normal     3.Elevated LFTs: There is no evidence of metastatic disease in the liver based on the imaging.  Hepatitis profile is negative.  He will require total body PET scan as an outpatient since lung biopsy showed squamous cell carcinoma.  4.  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  He is currently getting Samsca and managed by nephrologist    PLAN:  4. We will discuss his biopsy report-squamous cell carcinoma of the right lung when the family members are present.  5. Total body PET scan as an outpatient.           I discussed the patients findings and my recommendations with patient.        Electronically signed by Estefania Sanchez MD at 10/09/21 1054     Uzma Ochoa MD at 10/09/21 0936          Daily Progress Note        Sepsis (HCC)      Assessment    Right lower lobe mass, S post FNA 10/7/2021 + for Invasive poorly differentiated squamous cell carcinoma     Generalized weakness and lethargy and fatigue  Hyponatremia possible related to lung mass  COPD  Possible pneumonia with leukocytosis   hypokalemia  UTI     Hepatic steatosis  Hepatic transaminase derangement  -Elevated liver enzymes     Recommendations:    CT cervical spine, excruciating neck pain    Hepatitis panel, pending   Oncology following      Antibiotic Rocephin  Bronchodilator\inhaled corticosteroid  DVT prophylaxis Lovenox    Will need total body PET scan as outpatient              LOS: 3 days     Subjective         Objective     Vital signs for last 24 hours:  Vitals:    10/09/21 0200 10/09/21 0557 10/09/21 0710 10/09/21 0719   BP: 127/64 107/55     BP Location: Left arm Left arm     Patient Position: Lying Lying     Pulse: 91 92 80 77   Resp: 14 18 15 13   Temp: 98 °F (36.7 °C) 97.8 °F (36.6 °C)     TempSrc: Oral Oral     SpO2: 98% 98% 92% 93%   Weight:   86.5 kg (190 lb 11.2 oz)     Height:           Intake/Output last 3 shifts:  I/O last 3 completed shifts:  In: 530 [P.O.:480; I.V.:50]  Out: 2100 [Urine:2100]  Intake/Output this shift:  No intake/output data recorded.      Radiology  Imaging Results (Last 24 Hours)     ** No results found for the last 24 hours. **          Labs:  Results from last 7 days   Lab Units 10/08/21  0533   WBC 10*3/mm3 13.00*   HEMOGLOBIN g/dL 11.1*   HEMATOCRIT % 33.1*   PLATELETS 10*3/mm3 300     Results from last 7 days   Lab Units 10/09/21  0531 10/09/21  0117 10/09/21  0117 10/08/21  1205 10/08/21  0533   SODIUM mmol/L  --   --  130*   < > 127*   POTASSIUM mmol/L  --   --  3.5   < > 3.5   CHLORIDE mmol/L  --   --  92*   < > 89*   CO2 mmol/L  --   --  30.0*   < > 25.0   BUN mg/dL  --   --  18   < > 20   CREATININE mg/dL 0.59*   < > 0.62*   < > 0.62*   CALCIUM mg/dL  --   --  8.5*   < > 8.6   BILIRUBIN mg/dL  --   --   --   --  0.9   ALK PHOS U/L  --   --   --   --  74   ALT (SGPT) U/L  --   --   --   --  881*   AST (SGOT) U/L  --   --   --   --  965*   GLUCOSE mg/dL  --   --  111*   < > 103*    < > = values in this interval not displayed.         Results from last 7 days   Lab Units 10/08/21  0533 10/07/21  0547 10/06/21  0906   ALBUMIN g/dL 2.50* 2.60* 3.00*     Results from last 7 days   Lab Units 10/05/21  1951   CK TOTAL U/L 97   TROPONIN T ng/mL <0.010         Results from last 7 days   Lab Units 10/09/21  0531   MAGNESIUM mg/dL 2.2     Results from last 7 days   Lab Units 10/05/21  1951   INR  1.18*   APTT seconds 30.8     Results from last 7 days   Lab Units 10/05/21  1951   TSH uIU/mL 1.420           Meds:   SCHEDULE  aspirin, 81 mg, Oral, Daily  atorvastatin, 20 mg, Oral, Nightly  budesonide, 0.5 mg, Nebulization, BID - RT  cefTRIAXone, 1 g, Intravenous, Q24H  dilTIAZem CD, 180 mg, Oral, Q24H  enoxaparin, 40 mg, Subcutaneous, Daily  ferric gluconate, 125 mg, Intravenous, Daily  ipratropium-albuterol, 3 mL, Nebulization, 4x  Daily - RT      Infusions     PRNs  HYDROcodone-acetaminophen  •  magnesium sulfate **OR** magnesium sulfate **OR** magnesium sulfate  •  potassium chloride **OR** potassium chloride **OR** potassium chloride  •  [COMPLETED] Insert peripheral IV **AND** sodium chloride  •  tiZANidine    Physical Exam:  Physical Exam  Vitals reviewed.   Pulmonary:      Breath sounds: Rhonchi present.   Skin:     General: Skin is warm and dry.   Neurological:      Mental Status: He is alert.         ROS  Review of Systems   HENT:        Neck pain   Respiratory: Positive for cough and shortness of breath.    Musculoskeletal: Positive for arthralgias.       Reviewed the patient's new clinical results    Electronically signed by DAVID Quigley    Electronically signed by Uzma Ochoa MD at 10/09/21 5919     Andrade Barnes MD at 10/09/21 0719          NEPHROLOGY PROGRESS NOTE------[facility]    [facility]  [phone]  Ivis Barnes MD      Patient Care Team:  Herve Baez MD as PCP - General (Family Medicine)  Uche Mojica MD as Consulting Physician (Nephrology)  Estefania Sanchez MD as Consulting Physician (Hematology and Oncology)      Provider:  Ivis Barnes MD  Patient Name: Prabhjot Roldan  :  1935    SUBJECTIVE:    F/U HYPONATREMIA    Comfortable. Some neck pain. No SOB, CP, dysuria, palpitations. No HA, blurry vision, dizziness    Medication:  aspirin, 81 mg, Oral, Daily  atorvastatin, 20 mg, Oral, Nightly  budesonide, 0.5 mg, Nebulization, BID - RT  cefTRIAXone, 1 g, Intravenous, Q24H  dilTIAZem CD, 180 mg, Oral, Q24H  enoxaparin, 40 mg, Subcutaneous, Daily  ipratropium-albuterol, 3 mL, Nebulization, 4x Daily - RT           OBJECTIVE    Vital Sign Min/Max for last 24 hours  Temp  Min: 97.4 °F (36.3 °C)  Max: 98.5 °F (36.9 °C)   BP  Min: 107/55  Max: 136/64   Pulse  Min: 80  Max: 100   Resp  Min: 14  Max: 26   SpO2  Min:  "91 %  Max: 98 %   No data recorded   Weight  Min: 86.5 kg (190 lb 11.2 oz)  Max: 86.5 kg (190 lb 11.2 oz)     Flowsheet Rows      First Filed Value   Admission Height  175.3 cm (69\") Documented at 10/05/2021 1928   Admission Weight  86.2 kg (190 lb) Documented at 10/05/2021 1928          No intake/output data recorded.  I/O last 3 completed shifts:  In: 530 [P.O.:480; I.V.:50]  Out: 2100 [Urine:2100]    Physical Exam:  General Appearance: alert, appears stated age and cooperative  Head: normocephalic, without obvious abnormality and atraumatic  Eyes: conjunctivae and sclerae normal and no icterus  Neck: supple and no JVD  Lungs: +SCATTERED RHONCHI  Heart: regular rhythm & normal rate and normal S1, S2 +ROGERIO  Chest: Wall no abnormalities observed  Abdomen: normal bowel sounds and soft non-tender +OBESITY  Extremities: moves extremities well, no edema, no cyanosis and no redness +DJD  Skin: no bleeding, bruising or rash, turgor normal, color normal and no lesions noted  Neurologic: Alert, and oriented. No focal deficits    Labs:    WBC WBC   Date Value Ref Range Status   10/08/2021 13.00 (H) 3.40 - 10.80 10*3/mm3 Final   10/07/2021 15.60 (H) 3.40 - 10.80 10*3/mm3 Final   10/06/2021 19.60 (H) 3.40 - 10.80 10*3/mm3 Final      HGB Hemoglobin   Date Value Ref Range Status   10/08/2021 11.1 (L) 13.0 - 17.7 g/dL Final   10/07/2021 11.7 (L) 13.0 - 17.7 g/dL Final   10/06/2021 12.6 (L) 13.0 - 17.7 g/dL Final      HCT Hematocrit   Date Value Ref Range Status   10/08/2021 33.1 (L) 37.5 - 51.0 % Final   10/07/2021 34.3 (L) 37.5 - 51.0 % Final   10/06/2021 36.9 (L) 37.5 - 51.0 % Final      Platlets No results found for: LABPLAT   MCV MCV   Date Value Ref Range Status   10/08/2021 87.9 79.0 - 97.0 fL Final   10/07/2021 86.8 79.0 - 97.0 fL Final   10/06/2021 86.4 79.0 - 97.0 fL Final          Sodium Sodium   Date Value Ref Range Status   10/09/2021 130 (L) 136 - 145 mmol/L Final   10/08/2021 127 (L) 136 - 145 mmol/L Final "   10/08/2021 127 (L) 136 - 145 mmol/L Final   10/08/2021 127 (L) 136 - 145 mmol/L Final   10/07/2021 129 (L) 136 - 145 mmol/L Final   10/07/2021 125 (L) 136 - 145 mmol/L Final   10/07/2021 124 (L) 136 - 145 mmol/L Final   10/06/2021 123 (L) 136 - 145 mmol/L Final      Potassium Potassium   Date Value Ref Range Status   10/09/2021 3.5 3.5 - 5.2 mmol/L Final   10/08/2021 3.6 3.5 - 5.2 mmol/L Final     Comment:     Slight hemolysis detected by analyzer. Results may be affected.   10/08/2021 3.5 3.5 - 5.2 mmol/L Final     Comment:     Slight hemolysis detected by analyzer. Results may be affected.   10/08/2021 3.6 3.5 - 5.2 mmol/L Final   10/07/2021 3.7 3.5 - 5.2 mmol/L Final     Comment:     Slight hemolysis detected by analyzer. Results may be affected.   10/07/2021 3.6 3.5 - 5.2 mmol/L Final     Comment:     Slight hemolysis detected by analyzer. Results may be affected.   10/07/2021 3.7 3.5 - 5.2 mmol/L Final     Comment:     Slight hemolysis detected by analyzer. Results may be affected.   10/06/2021 3.1 (L) 3.5 - 5.2 mmol/L Final      Chloride Chloride   Date Value Ref Range Status   10/09/2021 92 (L) 98 - 107 mmol/L Final   10/08/2021 87 (L) 98 - 107 mmol/L Final   10/08/2021 89 (L) 98 - 107 mmol/L Final   10/08/2021 89 (L) 98 - 107 mmol/L Final   10/07/2021 91 (L) 98 - 107 mmol/L Final   10/07/2021 85 (L) 98 - 107 mmol/L Final   10/07/2021 87 (L) 98 - 107 mmol/L Final   10/06/2021 82 (L) 98 - 107 mmol/L Final      CO2 CO2   Date Value Ref Range Status   10/09/2021 30.0 (H) 22.0 - 29.0 mmol/L Final   10/08/2021 26.0 22.0 - 29.0 mmol/L Final   10/08/2021 25.0 22.0 - 29.0 mmol/L Final   10/08/2021 26.0 22.0 - 29.0 mmol/L Final   10/07/2021 24.0 22.0 - 29.0 mmol/L Final   10/07/2021 26.0 22.0 - 29.0 mmol/L Final   10/07/2021 24.0 22.0 - 29.0 mmol/L Final   10/06/2021 25.0 22.0 - 29.0 mmol/L Final      BUN BUN   Date Value Ref Range Status   10/09/2021 18 8 - 23 mg/dL Final   10/08/2021 21 8 - 23 mg/dL Final    10/08/2021 20 8 - 23 mg/dL Final   10/08/2021 22 8 - 23 mg/dL Final   10/07/2021 22 8 - 23 mg/dL Final   10/07/2021 22 8 - 23 mg/dL Final   10/07/2021 22 8 - 23 mg/dL Final   10/06/2021 14 8 - 23 mg/dL Final      Creatinine Creatinine   Date Value Ref Range Status   10/09/2021 0.59 (L) 0.76 - 1.27 mg/dL Final   10/09/2021 0.62 (L) 0.76 - 1.27 mg/dL Final   10/08/2021 0.66 (L) 0.76 - 1.27 mg/dL Final   10/08/2021 0.62 (L) 0.76 - 1.27 mg/dL Final   10/08/2021 0.65 (L) 0.76 - 1.27 mg/dL Final   10/07/2021 0.66 (L) 0.76 - 1.27 mg/dL Final   10/07/2021 0.65 (L) 0.76 - 1.27 mg/dL Final   10/07/2021 0.65 (L) 0.76 - 1.27 mg/dL Final   10/06/2021 0.67 (L) 0.76 - 1.27 mg/dL Final      Calcium Calcium   Date Value Ref Range Status   10/09/2021 8.5 (L) 8.6 - 10.5 mg/dL Final   10/08/2021 8.8 8.6 - 10.5 mg/dL Final   10/08/2021 8.6 8.6 - 10.5 mg/dL Final   10/08/2021 8.3 (L) 8.6 - 10.5 mg/dL Final   10/07/2021 8.3 (L) 8.6 - 10.5 mg/dL Final   10/07/2021 8.2 (L) 8.6 - 10.5 mg/dL Final   10/07/2021 8.4 (L) 8.6 - 10.5 mg/dL Final   10/06/2021 8.6 8.6 - 10.5 mg/dL Final      PO4 No components found for: PO4   Albumin Albumin   Date Value Ref Range Status   10/08/2021 2.50 (L) 3.50 - 5.20 g/dL Final   10/07/2021 2.60 (L) 3.50 - 5.20 g/dL Final   10/06/2021 3.00 (L) 3.50 - 5.20 g/dL Final      Magnesium Magnesium   Date Value Ref Range Status   10/09/2021 2.2 1.6 - 2.4 mg/dL Final   10/06/2021 1.5 (L) 1.6 - 2.4 mg/dL Final      Uric Acid No components found for: URIC ACID     Imaging Results (Last 72 Hours)     Procedure Component Value Units Date/Time    CT Needle Biopsy Lung [[id number]] Collected: 10/07/21 1005     Updated: 10/07/21 1127    Narrative:      DATE OF EXAM:  10/7/2021 9:27 AM     PROCEDURE:  CT NEEDLE BIOPSY LUNG-     INDICATIONS:  Right lower lobe lung mass; A41.9-Sepsis, unspecified organism;  L22-Diaper dermatitis; J18.9-Pneumonia, unspecified organism;  E87.2-Ppbg-heexpmecpr and hyponatremia;  E87.6-Hypokalemia;  R53.1-Weakness; R91.8-Other nonspecific abnormal finding of lung field     COMPARISON:  No Comparisons Available     FLUOROSCOPIC TIME:  CT fluoroscopy time 2.61 seconds     PHYSICIAN MONITORED CONSCIOUS SEDATION TIME:  15 minutes     CONTRAST MEDIA:  None     TECHNIQUE:   The patient's medications were reviewed prior to the procedure. The  procedure, risks and alternatives were discussed and informed written  consent was obtained. Maximal sterile barrier technique was utilized  throughout the procedure. The patient was placed in the right lateral  decubitus position and preliminary images were obtained. An appropriate  site was chosen for biopsy posteriorly over the right lower chest wall.  The skin was marked prepped and draped. IV conscious sedation was  administered consisting of Versed and fentanyl. The patient was  monitored by the IR nurse during the entire procedure. Utilizing maximal  sterile barrier technique and under local anesthesia a 17-gauge guide  needle was advanced incrementally into this area. The needle was  aspirated and no purulent fluid could be obtained. Multiple 18-gauge  core specimens were then obtained. These were reviewed by the  pathologist and felt to represent reactive fibrous tissue. Multiple  areas were sampled. These were placed in formalin. The needle was  removed and hemostasis achieved.          Impression:      Technically successful biopsy of the pleural-based mass at the right  lung base. Initial preliminary interpretation suggest dense fibrous  tissue. Final path results are pending. Postprocedure chest radiograph  is pending.     Electronically Signed By-Aly Brandon MD On:10/7/2021 10:07 AM  This report was finalized on [id number] by  Aly Brandon MD.    XR Chest 1 View [[id number]] Collected: 10/07/21 1015     Updated: 10/07/21 1127    Narrative:      DATE OF EXAM:  10/7/2021 10:12 AM     PROCEDURE:  XR CHEST 1 VW-     INDICATIONS:  s/p right lung  biopsy; A41.9-Sepsis, unspecified organism; L22-Diaper  dermatitis; J18.9-Pneumonia, unspecified organism; E87.4-Ynvz-akdrmdnyfm  and hyponatremia; E87.6-Hypokalemia; R53.1-Weakness; R91.8-Other  nonspecific abnormal finding of lung field       COMPARISON:  AP portable chest 10/5/2021. CT-guided needle biopsy of right lung Mass.  10/7/2021 at 9:44 AM.     TECHNIQUE:   Single radiographic view of the chest was obtained.     FINDINGS:  No pneumothorax is seen. Right lower lobe convex masslike density at the  right lateral costophrenic angle is again noted. Small right pleural  effusion is seen. Convex opacity in the medial left base corresponds to  focal eventration of the left hemidiaphragm, which is seen to better  advantage on prior CT chest study. Interstitial thickening or fibrotic  changes are thought to be present in both lungs. Heart size is normal.  Degenerative endplate spurring in the thoracic spine. Moderately  advanced degenerative changes of both shoulders.                   Impression:      No visible pneumothorax status post right lower lobe lung mass biopsy  earlier today.     Electronically Signed By-Opal Perales MD On:10/7/2021 10:17 AM  This report was finalized on [id number] by  Opal Perales MD.    US Liver [[id number]] Collected: 10/07/21 0948     Updated: 10/07/21 0952    Narrative:      DATE OF EXAM:  10/7/2021 9:29 AM     PROCEDURE:  US LIVER-     INDICATIONS:  Hepatic transaminase derangement; A41.9-Sepsis, unspecified organism;  L22-Diaper dermatitis; J18.9-Pneumonia, unspecified organism;  E87.6-Fcwl-wftabcpqtk and hyponatremia; E87.6-Hypokalemia;  R53.1-Weakness; R91.8-Other nonspecific abnormal finding of lung field     COMPARISON:  CT abdomen and pelvis 10/5/2021.     TECHNIQUE:   Grayscale and color Doppler ultrasound evaluation of the limited abdomen  was performed.     FINDINGS:  The liver size is normal, 16.3 cm in length. The liver maintains a  normal echotexture. There are  no focal liver lesions. The liver does not  appear grossly cirrhotic or steatotic. The main portal vein is patent.  Hepatic veins are patent. Common bile duct caliber is normal, 4 mm. No  intrahepatic biliary ductal dilation is identified.        Impression:      Normal hepatic ultrasound.     Electronically Signed By-Opal Perlaes MD On:10/7/2021 9:50 AM  This report was finalized on [id number] by  Opal Perales MD.          Results for orders placed during the hospital encounter of 10/05/21    XR Chest 1 View    Narrative  DATE OF EXAM:  10/7/2021 10:12 AM    PROCEDURE:  XR CHEST 1 VW-    INDICATIONS:  s/p right lung biopsy; A41.9-Sepsis, unspecified organism; L22-Diaper  dermatitis; J18.9-Pneumonia, unspecified organism; E87.7-Eeby-jiaybjmiof  and hyponatremia; E87.6-Hypokalemia; R53.1-Weakness; R91.8-Other  nonspecific abnormal finding of lung field    COMPARISON:  AP portable chest 10/5/2021. CT-guided needle biopsy of right lung Mass.  10/7/2021 at 9:44 AM.    TECHNIQUE:  Single radiographic view of the chest was obtained.    FINDINGS:  No pneumothorax is seen. Right lower lobe convex masslike density at the  right lateral costophrenic angle is again noted. Small right pleural  effusion is seen. Convex opacity in the medial left base corresponds to  focal eventration of the left hemidiaphragm, which is seen to better  advantage on prior CT chest study. Interstitial thickening or fibrotic  changes are thought to be present in both lungs. Heart size is normal.  Degenerative endplate spurring in the thoracic spine. Moderately  advanced degenerative changes of both shoulders.    Impression  No visible pneumothorax status post right lower lobe lung mass biopsy  earlier today.    Electronically Signed By-Opal Perales MD On:10/7/2021 10:17 AM  This report was finalized on [id number] by  Opal Perales MD.      XR Chest 1 View    Narrative  Examination: XR CHEST 1 VW-    Date of Exam: 10/5/2021 8:10  PM    Indication: Weakness.    Comparison: 05/20/2015    Technique: 1 view of the chest    FINDINGS:  The heart size is within normal. There is a lobulated opacity in the  inferior lateral right chest that measures about 5.5 cm; the lower  margin of this is obscured. There is some adjacent pleural thickening or  fluid in the inferior lateral right chest. These findings have developed  since the 2015 exam. There may be some minor patchy airspace opacity in  the lateral aspect of the left midlung. There is aortic calcification.  No significant bone abnormality.    Impression  1. There is an abnormality in the right lower chest. Part of this has a  masslike configuration. It may be due to parenchymal parenchymal  disease, pleural disease, or both. A lung mass is possible. Further  evaluation with chest CT with contrast is recommended.  2. Question some minor opacity in the lateral left midlung which may be  due to pneumonia.    Electronically Signed By-Margarita Cooper MD On:10/5/2021 9:06 PM  This report was finalized on [id number] by  Margarita Cooper MD.      Results for orders placed in visit on 04/14/21    SCANNED - IMAGING            ASSESSMENT / PLAN      Sepsis (HCC)    1. HYPONATREMIA/SIADH-------Stable and in safe range s/p Samsca. Follow with po fluid restriction alone but will tighten to 1200 cc/day. No neuro sx. Off thiazides    2. PNA/RIGHT LUNG MASS------per Pulmonary and Hem/Onc    3. BENIGN ESSENTIAL HTN-----BP okay. BP meds on hold    4. OA/DJD    5. HYPERLIPIDEMIA-------On Statin    6. ANEMIA------IV iron for LASHAY    7. DVT PROPHYLAXIS-----Lovenox    8. HYPOKALEMIA-----Replaced    9. HYPOMAGNESEMIA-----Replaced    Ivis Barnes MD  [facility]  [phone]  10/09/21  07:19 EDT      Electronically signed by Andrade Barnes MD at 10/09/21 1031     Kennedi Cárdenas APRN at 10/09/21 0743               LOS: 3 days   Patient Care Team:  Herve Baez MD as PCP -  General (Family Medicine)  Uche Mojica MD as Consulting Physician (Nephrology)  Estefania Sanchez MD as Consulting Physician (Hematology and Oncology)    Subjective:  Neck pain but states the pain medication does help    Objective:   Appears Weak      Review of Systems:   Review of Systems   Constitutional: Positive for activity change.   Neurological: Positive for weakness.   Positive for neck pain and headaches        Vital Signs  Temp:  [97.4 °F (36.3 °C)-98.5 °F (36.9 °C)] 97.8 °F (36.6 °C)  Heart Rate:  [] 77  Resp:  [13-26] 13  BP: (107-136)/(55-65) 107/55    Physical Exam:  Physical Exam  Constitutional:       General: He is not in acute distress.  Cardiovascular:      Rate and Rhythm: Rhythm irregular.      Heart sounds: Normal heart sounds.   Pulmonary:      Breath sounds: Normal breath sounds.   Skin:     General: Skin is warm.      Pt in neck brace with ice on    Radiology:  CT Head Without Contrast    Result Date: 10/5/2021  No acute intracranial abnormality. Electronically signed by:  Lenny Mckee  10/5/2021 9:37 PM    CT Chest With Contrast Diagnostic    Result Date: 10/5/2021  1. There is a large low-density mass in the right lung base measuring 9.5 x 5.3 cm. This is favored to represent a neoplastic process which must be considered and excluded. This lesion would be amenable to CT-guided biopsy as determined clinically. Alternatively, a PET/CT study could be obtained for better characterization. Follow-up is recommended. 2. Reticulonodular type infiltrates in the right upper lobe with additional nodular type groundglass and confluent groundglass airspace opacities in the left upper lobe and lingula. Findings are most likely related to infectious bronchopneumonia, possibly related to viral pneumonia and/or aspiration. COVID pneumonia should be considered. 3. Element of interlobular septal thickening may represent early pulmonary edema. 4. Emphysema. 5. Advanced coronary artery  calcifications. 6. Enlarged subcarinal lymph node measuring 1.8 cm in short axis. Metastatic disease must be excluded. 7. Hepatic steatosis. The liver also demonstrates a very subtle micronodular contour which may represent liver disease or early cirrhosis. Correlation with hepatic function is recommended. 8. Diverticulosis. 9. Severe atherosclerotic disease. 10. Additional incidental and chronic findings as above. *FLEISCHNER SOCIETY GUIDELINES: Low risk patient: <6mm - Low Risk, no further f/u >6-8mm - low dose CT 6-12 mo, then 2nd f/u CT at 18-24mo >8mm - low dose CT 3,9,24mo OR PET/CT OR Biopsy High Risk Patient: <6mm - optional CT at 12 mo >6-8mm - low dose CT 6-12 mo, then 2nd f/u CT at 18-24mo >8mm - low dose CT at 3 mo, OR PET/CT OR Biopsy MULTIPLE NODULES: Low Risk Patient: <6mm - Low Risk, no further f/u >6-8mm - low dose CT 3-6 mo, then 2nd f/u CT at 18-24mo >8mm - low dose CT 3-6 mo, then 2nd f/u CT at 18-24mo High Risk Patient: <6mm - High risk, multiple nodules, optional CT at 12 mo >6-8mm - low dose CT 3-6 mo, then 2nd f/u CT at 18-24mo >8mm - low dose CT 3-6 mo, then 2nd f/u CT at 18-24mo Slot 67 Electronically signed by:  Arcenio Vanegas M.D.  10/5/2021 9:30 PM    CT Abdomen Pelvis With Contrast    Result Date: 10/5/2021  1. There is a large low-density mass in the right lung base measuring 9.5 x 5.3 cm. This is favored to represent a neoplastic process which must be considered and excluded. This lesion would be amenable to CT-guided biopsy as determined clinically. Alternatively, a PET/CT study could be obtained for better characterization. Follow-up is recommended. 2. Reticulonodular type infiltrates in the right upper lobe with additional nodular type groundglass and confluent groundglass airspace opacities in the left upper lobe and lingula. Findings are most likely related to infectious bronchopneumonia, possibly related to viral pneumonia and/or aspiration. COVID pneumonia should be considered.  3. Element of interlobular septal thickening may represent early pulmonary edema. 4. Emphysema. 5. Advanced coronary artery calcifications. 6. Enlarged subcarinal lymph node measuring 1.8 cm in short axis. Metastatic disease must be excluded. 7. Hepatic steatosis. The liver also demonstrates a very subtle micronodular contour which may represent liver disease or early cirrhosis. Correlation with hepatic function is recommended. 8. Diverticulosis. 9. Severe atherosclerotic disease. 10. Additional incidental and chronic findings as above. *FLEISCHNER SOCIETY GUIDELINES: Low risk patient: <6mm - Low Risk, no further f/u >6-8mm - low dose CT 6-12 mo, then 2nd f/u CT at 18-24mo >8mm - low dose CT 3,9,24mo OR PET/CT OR Biopsy High Risk Patient: <6mm - optional CT at 12 mo >6-8mm - low dose CT 6-12 mo, then 2nd f/u CT at 18-24mo >8mm - low dose CT at 3 mo, OR PET/CT OR Biopsy MULTIPLE NODULES: Low Risk Patient: <6mm - Low Risk, no further f/u >6-8mm - low dose CT 3-6 mo, then 2nd f/u CT at 18-24mo >8mm - low dose CT 3-6 mo, then 2nd f/u CT at 18-24mo High Risk Patient: <6mm - High risk, multiple nodules, optional CT at 12 mo >6-8mm - low dose CT 3-6 mo, then 2nd f/u CT at 18-24mo >8mm - low dose CT 3-6 mo, then 2nd f/u CT at 18-24mo Slot 67 Electronically signed by:  Arcenio Vanegas M.D.  10/5/2021 9:30 PM    US Liver    Result Date: 10/7/2021  Normal hepatic ultrasound.  Electronically Signed By-Opal Perales MD On:10/7/2021 9:50 AM This report was finalized on [id number] by  Opal Perales MD.    XR Chest 1 View    Result Date: 10/7/2021  No visible pneumothorax status post right lower lobe lung mass biopsy earlier today.  Electronically Signed By-Opal Perales MD On:10/7/2021 10:17 AM This report was finalized on [id number] by  Opal Perales MD.    XR Chest 1 View    Result Date: 10/5/2021  1. There is an abnormality in the right lower chest. Part of this has a masslike configuration. It may be due to  parenchymal parenchymal disease, pleural disease, or both. A lung mass is possible. Further evaluation with chest CT with contrast is recommended. 2. Question some minor opacity in the lateral left midlung which may be due to pneumonia.  Electronically Signed By-Margarita Cooper MD On:10/5/2021 9:06 PM This report was finalized on [id number] by  Margarita Cooper MD.    CT Needle Biopsy Lung    Result Date: 10/7/2021  Technically successful biopsy of the pleural-based mass at the right lung base. Initial preliminary interpretation suggest dense fibrous tissue. Final path results are pending. Postprocedure chest radiograph is pending.  Electronically Signed By-Aly Brandon MD On:10/7/2021 10:07 AM This report was finalized on [id number] by  Aly Brandon MD.         Results Review:     I reviewed the patient's new clinical results.  I reviewed the patient's new imaging results and agree with the interpretation.    Medication Review:   Scheduled Meds:aspirin, 81 mg, Oral, Daily  atorvastatin, 20 mg, Oral, Nightly  budesonide, 0.5 mg, Nebulization, BID - RT  cefTRIAXone, 1 g, Intravenous, Q24H  dilTIAZem CD, 180 mg, Oral, Q24H  enoxaparin, 40 mg, Subcutaneous, Daily  ferric gluconate, 125 mg, Intravenous, Daily  ipratropium-albuterol, 3 mL, Nebulization, 4x Daily - RT      Continuous Infusions:   PRN Meds:.HYDROcodone-acetaminophen  •  magnesium sulfate **OR** magnesium sulfate **OR** magnesium sulfate  •  potassium chloride **OR** potassium chloride **OR** potassium chloride  •  [COMPLETED] Insert peripheral IV **AND** sodium chloride  •  tiZANidine    Labs:    CBC    Results from last 7 days   Lab Units 10/08/21  0533 10/07/21  0546 10/06/21  0906 10/05/21  1951   WBC 10*3/mm3 13.00* 15.60* 19.60* 14.20*   HEMOGLOBIN g/dL 11.1* 11.7* 12.6* 11.7*   PLATELETS 10*3/mm3 300 317 316 392     BMP   Results from last 7 days   Lab Units 10/09/21  0531 10/09/21  0117 10/08/21  1205 10/08/21  0533 10/08/21  0042 10/07/21  1902  10/07/21  1252 10/07/21  0547 10/07/21  0547 10/06/21  0906 10/06/21  0906 10/05/21  1951 10/05/21  1951   SODIUM mmol/L  --  130* 127* 127* 127* 129* 125*  --  124*   < > 123*   < > 120*   POTASSIUM mmol/L  --  3.5 3.6 3.5 3.6 3.7 3.6  --  3.7   < > 3.1*   < > 2.5*   CHLORIDE mmol/L  --  92* 87* 89* 89* 91* 85*  --  87*   < > 82*   < > 75*   CO2 mmol/L  --  30.0* 26.0 25.0 26.0 24.0 26.0  --  24.0   < > 25.0   < > 21.0*   BUN mg/dL  --  18 21 20 22 22 22  --  22   < > 14   < > 16   CREATININE mg/dL 0.59* 0.62* 0.66* 0.62* 0.65* 0.66* 0.65*   < > 0.65*   < > 0.67*   < > 0.96   GLUCOSE mg/dL  --  111* 153* 103* 128* 145* 103*  --  90   < > 101*   < > 178*   MAGNESIUM mg/dL 2.2  --   --   --   --   --   --   --   --   --  1.5*  --  1.5*    < > = values in this interval not displayed.     Cr Clearance Estimated Creatinine Clearance: 74.9 mL/min (A) (by C-G formula based on SCr of 0.59 mg/dL (L)).  Coag   Results from last 7 days   Lab Units 10/05/21  1951   INR  1.18*   APTT seconds 30.8     HbA1C No results found for: HGBA1C  Blood Glucose No results found for: POCGLU  Infection   Results from last 7 days   Lab Units 10/05/21  2001 10/05/21  1957 10/05/21  1951   BLOODCX  No growth at 3 days  --  No growth at 3 days   URINECX   --  No growth  --      CMP   Results from last 7 days   Lab Units 10/09/21  0531 10/09/21  0117 10/08/21  1205 10/08/21  0533 10/08/21  0042 10/07/21  1902 10/07/21  1252 10/07/21  0547 10/07/21  0547 10/06/21  0906 10/06/21  0906 10/05/21  1951 10/05/21  1951   SODIUM mmol/L  --  130* 127* 127* 127* 129* 125*  --  124*   < > 123*   < > 120*   POTASSIUM mmol/L  --  3.5 3.6 3.5 3.6 3.7 3.6  --  3.7   < > 3.1*   < > 2.5*   CHLORIDE mmol/L  --  92* 87* 89* 89* 91* 85*  --  87*   < > 82*   < > 75*   CO2 mmol/L  --  30.0* 26.0 25.0 26.0 24.0 26.0  --  24.0   < > 25.0   < > 21.0*   BUN mg/dL  --  18 21 20 22 22 22  --  22   < > 14   < > 16   CREATININE mg/dL 0.59* 0.62* 0.66* 0.62* 0.65* 0.66* 0.65*    < > 0.65*   < > 0.67*   < > 0.96   GLUCOSE mg/dL  --  111* 153* 103* 128* 145* 103*  --  90   < > 101*   < > 178*   ALBUMIN g/dL  --   --   --  2.50*  --   --   --   --  2.60*  --  3.00*  --  3.30*   BILIRUBIN mg/dL  --   --   --  0.9  --   --   --   --  0.9  --  0.8  --  0.6   ALK PHOS U/L  --   --   --  74  --   --   --   --  77  --  88  --  83   AST (SGOT) U/L  --   --   --  965*  --   --   --   --  814*  --  260*  --  155*   ALT (SGPT) U/L  --   --   --  881*  --   --   --   --  592*  --  177*  --  92*   LIPASE U/L  --   --   --   --   --   --   --   --   --   --   --   --  47    < > = values in this interval not displayed.     UA    Results from last 7 days   Lab Units 10/05/21  1957   NITRITE UA  Negative   WBC UA /HPF 31-50*   BACTERIA UA /HPF None Seen   SQUAM EPITHEL UA /HPF 7-12*   URINECX  No growth     Radiology(recent) XR Chest 1 View    Result Date: 10/7/2021  No visible pneumothorax status post right lower lobe lung mass biopsy earlier today.  Electronically Signed By-Opal Perales MD On:10/7/2021 10:17 AM This report was finalized on [id number] by  Opal Perales MD.    CT Needle Biopsy Lung    Result Date: 10/7/2021  Technically successful biopsy of the pleural-based mass at the right lung base. Initial preliminary interpretation suggest dense fibrous tissue. Final path results are pending. Postprocedure chest radiograph is pending.  Electronically Signed By-Aly Brandon MD On:10/7/2021 10:07 AM This report was finalized on [id number] by  Aly Brandon MD.     Assessment:    Acute mental status changes secondary to acute metabolic encephalopathy secondary to community-acquired pneumonia  Acute sepsis secondary to the above  Acute right lower lobe lung mass  Status post ultrasound-guided biopsy of right lower lobe mass 10/7/2021 which revealed invasive poorly diff squamous cell carcinoma  Panlobular COPD with emphysema  BPH with LUTS  Chronic mucopurulent bronchitis  Hepatic steatosis  Hepatic  transaminase derangement  Atherosclerotic heart disease of native coronary arteries with angina pectoris  Coronary calcifications  Chronic kidney disease stage II  Microscopic hematuria  Hypertension associated chronic kidney disease stage II  Hypokalemia likely secondary to the above  Hyponatremia secondary to SIADH  DDD L/S  Myofascial pain syndrome  Narcotic dependency    Plan:  Continue supportive care//electrolyte management//physical therapy  Oncology is consulting as well as pulmonology        DAVID Trevizo  10/09/21  09:40 EDT          Electronically signed by Kennedi Cárdenas APRN at 10/09/21 0942          Physical Therapy Notes (last 48 hours)      Zaria Sawyer PTA at 10/10/21 1627  Version 1 of 1       Assessment: Prabhjot Roldan presents with functional mobility impairments which indicate the need for skilled intervention. Tolerating session today without incident. Patient did not tolerate much activity due to pain. RN stated pt had mets to sine also and already had Norco. On 2L O2, sats 86>91%, HR 90>122. Plans are for rehab at [place].Will continue to follow and progress as tolerated.       Electronically signed by Zaria Sawyer PTA at 10/10/21 9723     Zaria Sawyer PTA at 10/10/21 1627  Version 1 of 1       Subjective: Pt agreeable to therapeutic plan of care. PT didn't want to stay in chair very long and wanted back to bed.    Objective:     Bed mobility - Min-A  Transfers - Min-A with UE support and extra time.  Ambulation -  feet N/A or Not attempted.    Pain: severe neck and top of head pain  Education: Provided education on importance of mobility and skilled verbal / tactile cueing throughout intervention.     Assessment: Prabhjot Roldan presents with functional mobility impairments which indicate the need for skilled intervention. Tolerating session today without incident. Patient did not tolerate much activity due to pain. RN stated pt had mets to sine also and already had  Norco. On 2L O2, sats 86>91%, HR 90>122. Plans are for rehab at [place].Will continue to follow and progress as tolerated.     Plan/Recommendations:   Pt would benefit from Inpatient Rehabilitation placement at discharge from facility and requires no DME at discharge.   Pt desires Inpatient Rehabilitation placement at discharge. Pt cooperative; agreeable to therapeutic recommendations and plan of care.     Basic Mobility 6-click:  Rollin = Total, A lot = 2, A little = 3; 4 = None  Supine>Sit:   1 = Total, A lot = 2, A little = 3; 4 = None   Sit>Stand with arms:  1 = Total, A lot = 2, A little = 3; 4 = None  Bed>Chair:   1 = Total, A lot = 2, A little = 3; 4 = None  Ambulate in room:  1 = Total, A lot = 2, A little = 3; 4 = None  3-5 Steps with railin = Total, A lot = 2, A little = 3; 4 = None  Score: 14      Post-Tx Position: Supine with HOB Elevated, Alarms activated and Call light and personal items within reach  PPE: gloves, surgical mask, eyewear protection    Electronically signed by Zaria Sawyer PTA at 10/10/21 4288          None

## 2025-02-13 DIAGNOSIS — O36.8330 MATERNAL CARE FOR ABNORMALITIES OF THE FETAL HEART RATE OR RHYTHM, THIRD TRIMESTER, NOT APPLICABLE OR UNSPECIFIED: ICD-10-CM

## 2025-02-13 DIAGNOSIS — N83.209 UNSPECIFIED OVARIAN CYST, UNSPECIFIED SIDE: ICD-10-CM

## 2025-02-13 DIAGNOSIS — D50.9 IRON DEFICIENCY ANEMIA, UNSPECIFIED: ICD-10-CM

## 2025-02-13 DIAGNOSIS — O99.013 ANEMIA COMPLICATING PREGNANCY, THIRD TRIMESTER: ICD-10-CM

## 2025-02-13 DIAGNOSIS — Z3A.38 38 WEEKS GESTATION OF PREGNANCY: ICD-10-CM

## 2025-02-13 DIAGNOSIS — D57.3 SICKLE-CELL TRAIT: ICD-10-CM

## 2025-02-13 DIAGNOSIS — O36.8130 DECREASED FETAL MOVEMENTS, THIRD TRIMESTER, NOT APPLICABLE OR UNSPECIFIED: ICD-10-CM

## 2025-02-13 DIAGNOSIS — O34.83 MATERNAL CARE FOR OTHER ABNORMALITIES OF PELVIC ORGANS, THIRD TRIMESTER: ICD-10-CM

## 2025-02-14 DIAGNOSIS — Z3A.37 37 WEEKS GESTATION OF PREGNANCY: ICD-10-CM

## 2025-02-14 DIAGNOSIS — Z3A.35 35 WEEKS GESTATION OF PREGNANCY: ICD-10-CM

## 2025-02-14 DIAGNOSIS — Z3A.36 36 WEEKS GESTATION OF PREGNANCY: ICD-10-CM

## 2025-02-20 ENCOUNTER — NON-APPOINTMENT (OUTPATIENT)
Age: 30
End: 2025-02-20

## 2025-02-20 ENCOUNTER — APPOINTMENT (OUTPATIENT)
Dept: OBGYN | Facility: CLINIC | Age: 30
End: 2025-02-20
Payer: COMMERCIAL

## 2025-02-20 VITALS
BODY MASS INDEX: 23.49 KG/M2 | SYSTOLIC BLOOD PRESSURE: 102 MMHG | WEIGHT: 137.56 LBS | HEIGHT: 64 IN | DIASTOLIC BLOOD PRESSURE: 70 MMHG

## 2025-02-20 PROBLEM — Z3A.39 39 WEEKS GESTATION OF PREGNANCY: Status: ACTIVE | Noted: 2025-02-20

## 2025-02-20 PROCEDURE — 59025 FETAL NON-STRESS TEST: CPT

## 2025-02-20 PROCEDURE — 0502F SUBSEQUENT PRENATAL CARE: CPT

## 2025-02-21 ENCOUNTER — NON-APPOINTMENT (OUTPATIENT)
Age: 30
End: 2025-02-21

## 2025-02-24 ENCOUNTER — APPOINTMENT (OUTPATIENT)
Dept: OBGYN | Facility: CLINIC | Age: 30
End: 2025-02-24
Payer: COMMERCIAL

## 2025-02-24 ENCOUNTER — NON-APPOINTMENT (OUTPATIENT)
Age: 30
End: 2025-02-24

## 2025-02-24 VITALS
HEIGHT: 64 IN | BODY MASS INDEX: 23.64 KG/M2 | DIASTOLIC BLOOD PRESSURE: 70 MMHG | WEIGHT: 138.5 LBS | SYSTOLIC BLOOD PRESSURE: 100 MMHG

## 2025-02-24 DIAGNOSIS — Z3A.39 39 WEEKS GESTATION OF PREGNANCY: ICD-10-CM

## 2025-02-24 PROCEDURE — 59025 FETAL NON-STRESS TEST: CPT

## 2025-02-24 PROCEDURE — 0502F SUBSEQUENT PRENATAL CARE: CPT

## 2025-02-25 PROBLEM — Z98.890 OTHER SPECIFIED POSTPROCEDURAL STATES: Chronic | Status: ACTIVE | Noted: 2025-02-12

## 2025-02-25 PROBLEM — B97.7 PAPILLOMAVIRUS AS THE CAUSE OF DISEASES CLASSIFIED ELSEWHERE: Chronic | Status: ACTIVE | Noted: 2025-02-12

## 2025-02-25 PROBLEM — Z86.2 PERSONAL HISTORY OF DISEASES OF THE BLOOD AND BLOOD-FORMING ORGANS AND CERTAIN DISORDERS INVOLVING THE IMMUNE MECHANISM: Chronic | Status: ACTIVE | Noted: 2025-02-12

## 2025-02-25 PROBLEM — Z86.39 PERSONAL HISTORY OF OTHER ENDOCRINE, NUTRITIONAL AND METABOLIC DISEASE: Chronic | Status: ACTIVE | Noted: 2025-02-12

## 2025-02-25 PROBLEM — Z87.59 PERSONAL HISTORY OF OTHER COMPLICATIONS OF PREGNANCY, CHILDBIRTH AND THE PUERPERIUM: Chronic | Status: ACTIVE | Noted: 2025-02-12

## 2025-02-25 LAB
APPEARANCE: CLEAR
BILIRUBIN URINE: NEGATIVE
BLOOD URINE: NEGATIVE
COLOR: YELLOW
GLUCOSE QUALITATIVE U: NEGATIVE
KETONES URINE: NEGATIVE
LEUKOCYTE ESTERASE URINE: NEGATIVE
NITRITE URINE: NEGATIVE
PH URINE: 7
PROTEIN URINE: NEGATIVE
SPECIFIC GRAVITY URINE: 1.01
UROBILINOGEN URINE: 0.2 (ref 0.2–?)

## 2025-02-28 ENCOUNTER — APPOINTMENT (OUTPATIENT)
Dept: OBGYN | Facility: CLINIC | Age: 30
End: 2025-02-28
Payer: COMMERCIAL

## 2025-02-28 ENCOUNTER — NON-APPOINTMENT (OUTPATIENT)
Age: 30
End: 2025-02-28

## 2025-02-28 ENCOUNTER — OUTPATIENT (OUTPATIENT)
Dept: INPATIENT UNIT | Facility: HOSPITAL | Age: 30
LOS: 1 days | End: 2025-02-28
Payer: COMMERCIAL

## 2025-02-28 VITALS — HEART RATE: 93 BPM | SYSTOLIC BLOOD PRESSURE: 117 MMHG | DIASTOLIC BLOOD PRESSURE: 72 MMHG

## 2025-02-28 VITALS
BODY MASS INDEX: 23.45 KG/M2 | WEIGHT: 137.38 LBS | SYSTOLIC BLOOD PRESSURE: 122 MMHG | DIASTOLIC BLOOD PRESSURE: 82 MMHG | HEIGHT: 64 IN

## 2025-02-28 VITALS — SYSTOLIC BLOOD PRESSURE: 113 MMHG | HEART RATE: 79 BPM | DIASTOLIC BLOOD PRESSURE: 72 MMHG

## 2025-02-28 DIAGNOSIS — O26.899 OTHER SPECIFIED PREGNANCY RELATED CONDITIONS, UNSPECIFIED TRIMESTER: ICD-10-CM

## 2025-02-28 DIAGNOSIS — Z3A.39 39 WEEKS GESTATION OF PREGNANCY: ICD-10-CM

## 2025-02-28 DIAGNOSIS — Z3A.38 38 WEEKS GESTATION OF PREGNANCY: ICD-10-CM

## 2025-02-28 DIAGNOSIS — Z3A.40 40 WEEKS GESTATION OF PREGNANCY: ICD-10-CM

## 2025-02-28 LAB
APPEARANCE: CLEAR
BILIRUBIN URINE: NEGATIVE
BLOOD URINE: NEGATIVE
COLOR: YELLOW
GLUCOSE QUALITATIVE U: NEGATIVE
KETONES URINE: NEGATIVE
LEUKOCYTE ESTERASE URINE: NEGATIVE
NITRITE URINE: NEGATIVE
PH URINE: 6
PROTEIN URINE: NEGATIVE
SPECIFIC GRAVITY URINE: 1.01
UROBILINOGEN URINE: 0.2 (ref 0.2–?)

## 2025-02-28 PROCEDURE — 99221 1ST HOSP IP/OBS SF/LOW 40: CPT | Mod: 25,GC

## 2025-02-28 PROCEDURE — 59025 FETAL NON-STRESS TEST: CPT

## 2025-02-28 PROCEDURE — 59425 ANTEPARTUM CARE ONLY: CPT

## 2025-02-28 PROCEDURE — 59025 FETAL NON-STRESS TEST: CPT | Mod: 26

## 2025-02-28 PROCEDURE — G0463: CPT

## 2025-02-28 NOTE — OB PROVIDER TRIAGE NOTE - ATTENDING COMMENTS
Patient presented for non-reactive NST at her OB office.  She is 40 weeks 2days  Here the FHRT was reactive immediately, and overall reassuring  BPP: 8/8, vtx, fundal placenta, LINA 10.3  We discussed proceeding with induction of labor due to her GA but since fetal testing is reassuring patient would prefer to wait for scheduled induction in 2 days.

## 2025-02-28 NOTE — OB RN TRIAGE NOTE - NSICDXPASTMEDICALHX_GEN_ALL_CORE_FT
PAST MEDICAL HISTORY:  H/O hyperemesis gravidarum     H/O iron deficiency anemia     H/O sickle cell trait     History of colposcopy     History of elevated glucose     HPV in female

## 2025-02-28 NOTE — OB PROVIDER TRIAGE NOTE - NSOBPROVIDERNOTE_OBGYN_ALL_OB_FT
A/P: JERMAINE RODRIGUEZ is a 29y  at 40.2 weeks GA who presents to L&D after being referred by her OB provider in view of a non-reactive NST. For prolonged fetal monitoring.     VSS  Fetus: Reactive  [place]: irregular  BPP: BPP: 8/8, vtx, fundal placenta, LINA 10.3  SVE: /3 - unchanged from this am    Discussed with patient the option of getting an induction today in view of her current GA. Patient declined and stated that in view of normal BPP, reactive NST and unchanged SVE, she would like to keep her induction date for 3/2/2025.    Discussed with Dr. Parmar

## 2025-02-28 NOTE — OB PROVIDER TRIAGE NOTE - NSHPPHYSICALEXAM_GEN_ALL_CORE
T(C): 37.1 (02-28-25 @ 18:00), Max: 37.1 (02-28-25 @ 18:00)  HR: 79 (02-28-25 @ 18:28) (79 - 93)  BP: 113/72 (02-28-25 @ 18:28) (113/72 - 117/72)  RR: 16 (02-28-25 @ 18:00) (16 - 16)  SpO2: --    Gen: NAD, well-appearing  Abd: soft, gravid  Ext: non-edematous, non-tender   SVE: 1/25/-3  FHT: baseline 140bpm, moderate variability, +accels, -decels  [place]: irregular  BPP: 8/8, vtx, fundal placenta, LINA 10.3

## 2025-02-28 NOTE — OB RN TRIAGE NOTE - NS_RSVVACCINERECEIVED_OBGYN_ALL_OB
Patient ID: Joaquin Talbot is a 21 y.o. male.  Referring Physician: Julio Villeda MD  [id number]  Pediatrics-Hematology and Oncology  [place] [phone]  Primary Care Provider: Virgilio Torres DO    Date of Service:  10/23/2023    SUBJECTIVE:    History of Present Illness:  Joaquin is a 21 year old male with severe aplastic anemia on immunosuppressive therapy here for follow up.     Joaquin was last seen in [facility] on 9/18/23. He had labs done on 10/16/23 which showed a subtherapeutic cyclosporine level 134, in which his dose was increased from 250mg daily (150mg AM, 100mg PM) -> 300mg daily (200mg AM, 100mg PM). Joaquin reports taking the increased dose from Wednesday morning and has not missed any doses. Joaquin reports that he has overall been well since his last clinic visit. No recent febrile illness. Joaquin reports good energy levels throughout the day. Denies any chest pain, palpitations, shortness of breath at rest or on exertion, dizziness, syncopal episodes or headaches. Denies any easy bruising or bleeding including hematuria, hematochezia, melena, gum bleeding or recent epistaxis. Denies any oral sores, throat pain or rectal pain. Joaquin has no new concerns today.          Past Medical History: Joaquin has a past medical history of Body mass index (BMI) pediatric, 5th percentile to less than 85th percentile for age (04/30/2022), Encounter for examination for participation in sport (08/02/2021), Encounter for routine child health examination without abnormal findings (08/31/2020), Essential (primary) hypertension, Hemorrhage due to vascular prosthetic devices, implants and grafts, initial encounter (CMS/[facility]) (03/08/2022), Other conditions influencing health status (12/22/2021), Other disorders affecting eyelid function (02/03/2017), Personal history of other diseases of the respiratory system (12/23/2021), Personal history of other diseases of the respiratory  "system (12/22/2021), Personal history of other drug therapy (01/28/2022), Personal history of other specified conditions (12/22/2021), Strain of adductor muscle, fascia and tendon of unspecified thigh, initial encounter (09/15/2021), and Unspecified injury of abdomen, initial encounter (02/03/2017).    Surgical History:  Joaquin has a past surgical history that includes Other surgical history (08/04/2022) and Other surgical history (08/04/2022).    Social History:  Joaquin     Family History   Problem Relation Name Age of Onset    Diabetes Father         Review of Systems   Constitutional: Negative.  Negative for activity change, appetite change, fatigue, fever and unexpected weight change.   HENT: Negative.  Negative for congestion, mouth sores, nosebleeds, rhinorrhea, sore throat and trouble swallowing.    Eyes: Negative.    Respiratory: Negative.  Negative for chest tightness and shortness of breath.    Cardiovascular: Negative.  Negative for chest pain, palpitations and leg swelling.   Gastrointestinal:  Negative for abdominal distention, abdominal pain, blood in stool, constipation, diarrhea, nausea, rectal pain and vomiting.   Endocrine: Negative.    Genitourinary: Negative.    Musculoskeletal: Negative.  Negative for back pain.   Skin: Negative.  Negative for color change and rash.   Allergic/Immunologic: Positive for immunocompromised state.   Neurological: Negative.  Negative for dizziness, numbness and headaches.   Hematological: Negative.  Does not bruise/bleed easily.   Psychiatric/Behavioral: Negative.     All other systems reviewed and are negative.    OBJECTIVE:    VS:  /77 (BP Location: Right arm, Patient Position: Lying, BP Cuff Size: Adult)   Pulse 77   Temp 36.5 °C (97.7 °F) (Temporal)   Resp 20   Ht 1.811 m (5' 11.3\")   Wt 77 kg (169 lb 12.1 oz)   SpO2 99%   BMI 23.48 kg/m²   BSA: 1.97 meters squared    Physical Exam  Vitals and nursing note reviewed.   Constitutional:       " General: He is not in acute distress.     Appearance: Normal appearance. He is normal weight. He is not ill-appearing or toxic-appearing.   HENT:      Head: Normocephalic and atraumatic.      Nose: Nose normal.      Mouth/Throat:      Mouth: Mucous membranes are moist.      Pharynx: No oropharyngeal exudate or posterior oropharyngeal erythema.   Eyes:      Extraocular Movements: Extraocular movements intact.      Conjunctiva/sclera: Conjunctivae normal.      Pupils: Pupils are equal, round, and reactive to light.   Cardiovascular:      Rate and Rhythm: Normal rate and regular rhythm.      Pulses: Normal pulses.      Heart sounds: No murmur heard.  Pulmonary:      Effort: Pulmonary effort is normal.      Breath sounds: Normal breath sounds.   Abdominal:      General: Bowel sounds are normal. There is no distension.      Palpations: Abdomen is soft. There is no mass.      Tenderness: There is no abdominal tenderness. There is no guarding.   Musculoskeletal:         General: Normal range of motion.      Cervical back: Neck supple.   Lymphadenopathy:      Cervical: No cervical adenopathy.   Skin:     General: Skin is warm.      Capillary Refill: Capillary refill takes less than 2 seconds.      Coloration: Skin is not pale.      Findings: No bruising or rash.   Neurological:      General: No focal deficit present.      Mental Status: He is alert and oriented to person, place, and time. Mental status is at baseline.   Psychiatric:         Mood and Affect: Mood normal.         Laboratory:   Latest Reference Range & Units 10/23/23 09:14   GLUCOSE 74 - 99 mg/dL 91   SODIUM 136 - 145 mmol/L 139   POTASSIUM 3.5 - 5.3 mmol/L 4.2   CHLORIDE 98 - 107 mmol/L 105   Bicarbonate 21 - 32 mmol/L 30   Anion Gap 10 - 20 mmol/L 8 (L)   Blood Urea Nitrogen 6 - 23 mg/dL 21   Creatinine 0.50 - 1.30 mg/dL 1.09   EGFR >60 mL/min/1.73m*2 >90   Calcium 8.6 - 10.6 mg/dL 10.3   PHOSPHORUS 2.5 - 4.9 mg/dL 4.1   Albumin 3.4 - 5.0 g/dL 4.7   Alkaline  Phosphatase 33 - 120 U/L 55   ALT 10 - 52 U/L 16   AST 9 - 39 U/L 17   Bilirubin Total 0.0 - 1.2 mg/dL 1.2   Bilirubin, Direct 0.0 - 0.3 mg/dL 0.1   Total Protein 6.4 - 8.2 g/dL 7.6   WBC 4.4 - 11.3 x10*3/uL 4.4   nRBC 0.0 - 0.0 /100 WBCs 0.0   RBC 4.50 - 5.90 x10*6/uL 4.24 (L)   HEMOGLOBIN 13.5 - 17.5 g/dL 12.4 (L)   HEMATOCRIT 41.0 - 52.0 % 37.0 (L)   MCV 80 - 100 fL 87   MCH 26.0 - 34.0 pg 29.2   MCHC 32.0 - 36.0 g/dL 33.5   RED CELL DISTRIBUTION WIDTH 11.5 - 14.5 % 11.6   Platelets 150 - 450 x10*3/uL 224   MEAN PLATELET VOLUME 7.5 - 11.5 fL 9.4   Neutrophils % 40.0 - 80.0 % 45.4   Immature Granulocytes %, Automated 0.0 - 0.9 % 0.2   Lymphocytes % 13.0 - 44.0 % 42.0   Monocytes % 2.0 - 10.0 % 9.3   Eosinophils % 0.0 - 6.0 % 2.9   Basophils % 0.0 - 2.0 % 0.2   Neutrophils Absolute 1.20 - 7.70 x10*3/uL 2.00   Immature Granulocytes Absolute, Automated 0.00 - 0.70 x10*3/uL 0.01   Lymphocytes Absolute 1.20 - 4.80 x10*3/uL 1.85   Monocytes Absolute 0.10 - 1.00 x10*3/uL 0.41   Eosinophils Absolute 0.00 - 0.70 x10*3/uL 0.13   Basophils Absolute 0.00 - 0.10 x10*3/uL 0.01   Cyclosporine 80 - 210 ng/mL 221 (H)   Immature Retic fraction <=16.0 % 9.1   Retic Absolute 0.022 - 0.118 x10*6/uL 0.074   Retic % 0.5 - 2.0 % 1.8   Reticulocyte Hemoglobin 28 - 38 pg 33        Latest Reference Range & Units 10/23/23 09:14   Cyclosporine 80 - 210 ng/mL 221 (H)         ASSESSMENT and PLAN:  Assessment:  Joaquin is a 21 year old male with severe aplastic anemia here for follow up. He is s/p ATG (2/23-2/26/2022) and is currently on CSA and Eltrombopag. His course was complicated by steroid induced hyperglycemia/ hypertension (resolved), CSA induced gingival hyperplasia (responded to Azithomycin and gum reduction procedure by periodontist) which is also resolved now. He has responded to immunosuppressive therapy with count recovery which has since been stable. His last PRBC transfusion was on 4/1/22 and platelets were on 4/25/22 (given  prior to dental procedure). He is currently on cyclosporine 300mg daily (200mg AM and 100mg PM) which was increased on 10/16 due to a subtherapeutic level 134. CBC today is stable with Hb 12.4, WBC 4.4 with normal ANC 2000, and normal platelet count 224K. Cyclosporine level is 221 today (goal of 200-400).        Plan:  Aplastic Anemia:  - Continue Cyclosporine 300 mg daily (200 mg AM and 100 mg PM), and will recheck Cyclosporine level at his next visit in 3 weeks. Advised to hold that morning's dose before level is drawn.   - Continue Promacta 150mg daily  - PJP prophylaxis: Received Pentamidine today. Next dose due on 11/20/23  - Fungal prophylaxis: Continue Fluconazole 400mg daily      Plan discussed with patient who voiced understanding and all questions were answered  Patient was seen and discussed with Pediatric Hematologist/Oncologist, Dr. Andrew            No

## 2025-02-28 NOTE — OB PROVIDER TRIAGE NOTE - HISTORY OF PRESENT ILLNESS
JERMAINE RODRIGUEZ is a 29y  at 40.2 weeks GA who presents to L&D after being referred by her OB provider in view of a non-reactive NST. Patient not endorsing any vaginal bleeding or abnormal discharge. She denies any leakage of fluid. She is endorsing good fetal movements. Patient has been endorsing contractions for the past few days, she states that they are currently mild but occurring every 5 minutes. Patient was examined this morning and she was 1cm dilated. She denies any urinary symptoms. No other complaints at the time.     Prenatal course uncomplicated.      POB:   PGYN: -fibroids, +ovarian cysts, denies STD hx, +1 abnormal PAP in  w/normal colposcopy (subsequent PAP normal)  PMH: iron deficiency anemia, sickle cell trait  PSH: oral surgery  SH: Denies EtOH, tobacco and illicit drug use during this pregnancy  Meds: PNVs, iron supplements  Allergies: clindamycin

## 2025-03-01 ENCOUNTER — INPATIENT (INPATIENT)
Facility: HOSPITAL | Age: 30
LOS: 2 days | Discharge: HOME | DRG: 833 | End: 2025-03-04
Attending: OBSTETRICS & GYNECOLOGY | Admitting: OBSTETRICS & GYNECOLOGY
Payer: COMMERCIAL

## 2025-03-01 VITALS — TEMPERATURE: 98 F

## 2025-03-01 DIAGNOSIS — Z98.890 OTHER SPECIFIED POSTPROCEDURAL STATES: Chronic | ICD-10-CM

## 2025-03-01 DIAGNOSIS — O26.893 OTHER SPECIFIED PREGNANCY RELATED CONDITIONS, THIRD TRIMESTER: ICD-10-CM

## 2025-03-01 DIAGNOSIS — O26.899 OTHER SPECIFIED PREGNANCY RELATED CONDITIONS, UNSPECIFIED TRIMESTER: ICD-10-CM

## 2025-03-01 LAB
ALBUMIN SERPL ELPH-MCNC: 3.8 G/DL — SIGNIFICANT CHANGE UP (ref 3.3–5.2)
ALP SERPL-CCNC: 116 U/L — SIGNIFICANT CHANGE UP (ref 40–120)
ALT FLD-CCNC: 16 U/L — SIGNIFICANT CHANGE UP
ANION GAP SERPL CALC-SCNC: 17 MMOL/L — SIGNIFICANT CHANGE UP (ref 5–17)
AST SERPL-CCNC: 24 U/L — SIGNIFICANT CHANGE UP
BASOPHILS # BLD AUTO: 0.03 K/UL — SIGNIFICANT CHANGE UP (ref 0–0.2)
BASOPHILS NFR BLD AUTO: 0.3 % — SIGNIFICANT CHANGE UP (ref 0–2)
BILIRUB SERPL-MCNC: 0.3 MG/DL — LOW (ref 0.4–2)
BLD GP AB SCN SERPL QL: SIGNIFICANT CHANGE UP
BUN SERPL-MCNC: 7.6 MG/DL — LOW (ref 8–20)
CALCIUM SERPL-MCNC: 9.3 MG/DL — SIGNIFICANT CHANGE UP (ref 8.4–10.5)
CHLORIDE SERPL-SCNC: 99 MMOL/L — SIGNIFICANT CHANGE UP (ref 96–108)
CO2 SERPL-SCNC: 19 MMOL/L — LOW (ref 22–29)
CREAT ?TM UR-MCNC: 17 MG/DL — SIGNIFICANT CHANGE UP
CREAT SERPL-MCNC: 0.48 MG/DL — LOW (ref 0.5–1.3)
EGFR: 133 ML/MIN/1.73M2 — SIGNIFICANT CHANGE UP
EGFR: 133 ML/MIN/1.73M2 — SIGNIFICANT CHANGE UP
EOSINOPHIL # BLD AUTO: 0.15 K/UL — SIGNIFICANT CHANGE UP (ref 0–0.5)
EOSINOPHIL NFR BLD AUTO: 1.6 % — SIGNIFICANT CHANGE UP (ref 0–6)
GLUCOSE SERPL-MCNC: 58 MG/DL — LOW (ref 70–99)
HCT VFR BLD CALC: 39.1 % — SIGNIFICANT CHANGE UP (ref 34.5–45)
HGB BLD-MCNC: 13.2 G/DL — SIGNIFICANT CHANGE UP (ref 11.5–15.5)
IMM GRANULOCYTES # BLD AUTO: 0.05 K/UL — SIGNIFICANT CHANGE UP (ref 0–0.07)
IMM GRANULOCYTES NFR BLD AUTO: 0.5 % — SIGNIFICANT CHANGE UP (ref 0–0.9)
LYMPHOCYTES # BLD AUTO: 2.08 K/UL — SIGNIFICANT CHANGE UP (ref 1–3.3)
LYMPHOCYTES NFR BLD AUTO: 22 % — SIGNIFICANT CHANGE UP (ref 13–44)
MCHC RBC-ENTMCNC: 26 PG — LOW (ref 27–34)
MCHC RBC-ENTMCNC: 33.8 G/DL — SIGNIFICANT CHANGE UP (ref 32–36)
MCV RBC AUTO: 77.1 FL — LOW (ref 80–100)
MONOCYTES # BLD AUTO: 0.99 K/UL — HIGH (ref 0–0.9)
MONOCYTES NFR BLD AUTO: 10.5 % — SIGNIFICANT CHANGE UP (ref 2–14)
NEUTROPHILS # BLD AUTO: 6.15 K/UL — SIGNIFICANT CHANGE UP (ref 1.8–7.4)
NEUTROPHILS NFR BLD AUTO: 65.1 % — SIGNIFICANT CHANGE UP (ref 43–77)
NRBC # BLD AUTO: 0 K/UL — SIGNIFICANT CHANGE UP (ref 0–0)
NRBC # FLD: 0 K/UL — SIGNIFICANT CHANGE UP (ref 0–0)
PLATELET # BLD AUTO: 215 K/UL — SIGNIFICANT CHANGE UP (ref 150–400)
PMV BLD: SIGNIFICANT CHANGE UP FL (ref 7–13)
POTASSIUM SERPL-MCNC: 3.9 MMOL/L — SIGNIFICANT CHANGE UP (ref 3.5–5.3)
POTASSIUM SERPL-SCNC: 3.9 MMOL/L — SIGNIFICANT CHANGE UP (ref 3.5–5.3)
PROT ?TM UR-MCNC: 5 MG/DL — SIGNIFICANT CHANGE UP (ref 0–12)
PROT SERPL-MCNC: 7.3 G/DL — SIGNIFICANT CHANGE UP (ref 6.6–8.7)
PROT/CREAT UR-RTO: 0.3 RATIO — HIGH
RBC # BLD: 5.07 M/UL — SIGNIFICANT CHANGE UP (ref 3.8–5.2)
RBC # FLD: 16.2 % — HIGH (ref 10.3–14.5)
SODIUM SERPL-SCNC: 135 MMOL/L — SIGNIFICANT CHANGE UP (ref 135–145)
T PALLIDUM AB TITR SER: NEGATIVE — SIGNIFICANT CHANGE UP
WBC # BLD: 9.45 K/UL — SIGNIFICANT CHANGE UP (ref 3.8–10.5)
WBC # FLD AUTO: 9.45 K/UL — SIGNIFICANT CHANGE UP (ref 3.8–10.5)

## 2025-03-01 RX ORDER — SODIUM CHLORIDE 9 G/1000ML
1000 INJECTION, SOLUTION INTRAVENOUS
Refills: 0 | Status: DISCONTINUED | OUTPATIENT
Start: 2025-03-01 | End: 2025-03-02

## 2025-03-01 RX ORDER — OXYTOCIN-SODIUM CHLORIDE 0.9% IV SOLN 30 UNIT/500ML 30-0.9/5 UT/ML-%
2 SOLUTION INTRAVENOUS
Qty: 30 | Refills: 0 | Status: DISCONTINUED | OUTPATIENT
Start: 2025-03-01 | End: 2025-03-04

## 2025-03-01 RX ORDER — CITRIC ACID/SODIUM CITRATE 300-500 MG
30 SOLUTION, ORAL ORAL ONCE
Refills: 0 | Status: DISCONTINUED | OUTPATIENT
Start: 2025-03-01 | End: 2025-03-02

## 2025-03-01 RX ORDER — OXYTOCIN-SODIUM CHLORIDE 0.9% IV SOLN 30 UNIT/500ML 30-0.9/5 UT/ML-%
167 SOLUTION INTRAVENOUS
Qty: 30 | Refills: 0 | Status: DISCONTINUED | OUTPATIENT
Start: 2025-03-01 | End: 2025-03-04

## 2025-03-01 RX ORDER — ONDANSETRON HCL/PF 4 MG/2 ML
4 VIAL (ML) INJECTION ONCE
Refills: 0 | Status: COMPLETED | OUTPATIENT
Start: 2025-03-01 | End: 2025-03-01

## 2025-03-01 RX ADMIN — Medication 4 MILLIGRAM(S): at 06:11

## 2025-03-01 RX ADMIN — SODIUM CHLORIDE 125 MILLILITER(S): 9 INJECTION, SOLUTION INTRAVENOUS at 22:29

## 2025-03-01 RX ADMIN — SODIUM CHLORIDE 125 MILLILITER(S): 9 INJECTION, SOLUTION INTRAVENOUS at 07:16

## 2025-03-01 RX ADMIN — OXYTOCIN-SODIUM CHLORIDE 0.9% IV SOLN 30 UNIT/500ML 2 MILLIUNIT(S)/MIN: 30-0.9/5 SOLUTION at 20:43

## 2025-03-01 RX ADMIN — SODIUM CHLORIDE 125 MILLILITER(S): 9 INJECTION, SOLUTION INTRAVENOUS at 07:15

## 2025-03-01 NOTE — OB PROVIDER H&P - NSHPPHYSICALEXAM_GEN_ALL_CORE
Vital Signs Last 24 Hrs  T(C): 36.6 (01 Mar 2025 05:52), Max: 37.1 (28 Feb 2025 18:00)  T(F): 97.9 (01 Mar 2025 05:52), Max: 98.8 (28 Feb 2025 18:00)  HR: 92 (01 Mar 2025 06:02) (79 - 93)  BP: 148/78 (01 Mar 2025 06:02) (113/72 - 148/78)  BP(mean): --  RR: 16 (01 Mar 2025 05:52) (16 - 16)  SpO2: --    Parameters below as of 01 Mar 2025 05:52  Patient On (Oxygen Delivery Method): room air    Gen: AAOx3  Abd: soft, gravid  Ext: no tenderness to calf palpation    SVE: 4/80/-2 @0551  FHT: baseline 145, moderate variability, + accels, -decels  [place]: CTX q2-5min

## 2025-03-01 NOTE — OB RN PATIENT PROFILE - RESPIRATORY RATE (BREATHS/MIN)
Patient name: Sanjana Paredes  MRN: [id number]  : 1935  Cath Lab Procedure H&P Update    Pre-Procedure Assessment:    I saw and examined the patient face to face. The patient has been re-evaluated and her condition is unchanged. The reason for admission, procedure and care is still present.  Based on the patients H&P, pre-procedure physical exam, relevant diagnostic studies, NPO status and information obtained from the patient, I determine the patient is an appropriate candidate for the proposed procedure and anesthesia planned. I further certify the anesthesia risks, benefits and options have been explained to the patient to which she agrees as documented on the procedural consent.    See scanned updated H&P and additional records from media tab.      [place]     16

## 2025-03-01 NOTE — OB PROVIDER H&P - ASSESSMENT
HAJA RODRIGUEZ is a 28 yo  at 40w3d who presents in labor.    -Admit to L&D  -Consent  -Admission labs  -IV fluids  -Labor: /-2 @5888. Intact  -Fetus: Cat I tracing. Continuous toco and fetal monitoring.   -GBS: Negative, no GBS ppx required   -Analgesia: Undecided on epidural    Will call Dr. Graf and update [facility] attending Dr. Parmar

## 2025-03-01 NOTE — OB PROVIDER H&P - HISTORY OF PRESENT ILLNESS
HAJA RODRIGUEZ is a 30 yo  at 40w3d who presents in labor. She reports contractions since 2AM. She dneies leakage of fluid, vaginal bleeding. She reports good fetal movement.    PNC: Uncomplicated    POB:   PGYN: -fibroids, +ovarian cysts, denies STD hx, +1 abnormal PAP in  w/normal colposcopy (subsequent PAP normal)  PMH: iron deficiency anemia, sickle cell trait  PSH: oral surgery  SH: Denies EtOH, tobacco and illicit drug use during this pregnancy  Meds: PNVs, iron supplements  Allergies: clindamycin

## 2025-03-02 LAB
HBV SURFACE AG SER-ACNC: SIGNIFICANT CHANGE UP
MEV IGG SER-ACNC: 104 AU/ML — SIGNIFICANT CHANGE UP
MEV IGG+IGM SER-IMP: POSITIVE — SIGNIFICANT CHANGE UP
RUBV IGG SER-ACNC: 2.69 INDEX — SIGNIFICANT CHANGE UP
RUBV IGG SER-IMP: POSITIVE — SIGNIFICANT CHANGE UP

## 2025-03-02 RX ORDER — IBUPROFEN 200 MG
600 TABLET ORAL EVERY 6 HOURS
Refills: 0 | Status: DISCONTINUED | OUTPATIENT
Start: 2025-03-02 | End: 2025-03-04

## 2025-03-02 RX ORDER — OXYTOCIN-SODIUM CHLORIDE 0.9% IV SOLN 30 UNIT/500ML 30-0.9/5 UT/ML-%
167 SOLUTION INTRAVENOUS
Qty: 30 | Refills: 0 | Status: DISCONTINUED | OUTPATIENT
Start: 2025-03-02 | End: 2025-03-04

## 2025-03-02 RX ORDER — MODIFIED LANOLIN 100 %
1 CREAM (GRAM) TOPICAL EVERY 6 HOURS
Refills: 0 | Status: DISCONTINUED | OUTPATIENT
Start: 2025-03-02 | End: 2025-03-04

## 2025-03-02 RX ORDER — BENZOCAINE 220 MG/G
1 SPRAY, METERED PERIODONTAL EVERY 6 HOURS
Refills: 0 | Status: DISCONTINUED | OUTPATIENT
Start: 2025-03-02 | End: 2025-03-04

## 2025-03-02 RX ORDER — SODIUM CHLORIDE 9 G/1000ML
1000 INJECTION, SOLUTION INTRAVENOUS
Refills: 0 | Status: DISCONTINUED | OUTPATIENT
Start: 2025-03-02 | End: 2025-03-04

## 2025-03-02 RX ORDER — ONDANSETRON HCL/PF 4 MG/2 ML
4 VIAL (ML) INJECTION ONCE
Refills: 0 | Status: DISCONTINUED | OUTPATIENT
Start: 2025-03-02 | End: 2025-03-04

## 2025-03-02 RX ORDER — PRENATAL 136/IRON/FOLIC ACID 27 MG-1 MG
1 TABLET ORAL
Refills: 0 | DISCHARGE

## 2025-03-02 RX ORDER — WITCH HAZEL LEAF
1 FLUID EXTRACT MISCELLANEOUS EVERY 4 HOURS
Refills: 0 | Status: DISCONTINUED | OUTPATIENT
Start: 2025-03-02 | End: 2025-03-04

## 2025-03-02 RX ORDER — OXYCODONE HYDROCHLORIDE 30 MG/1
5 TABLET ORAL ONCE
Refills: 0 | Status: DISCONTINUED | OUTPATIENT
Start: 2025-03-02 | End: 2025-03-04

## 2025-03-02 RX ORDER — OXYCODONE HYDROCHLORIDE 30 MG/1
5 TABLET ORAL
Refills: 0 | Status: DISCONTINUED | OUTPATIENT
Start: 2025-03-02 | End: 2025-03-04

## 2025-03-02 RX ORDER — DIPHENHYDRAMINE HCL 12.5MG/5ML
25 ELIXIR ORAL EVERY 6 HOURS
Refills: 0 | Status: DISCONTINUED | OUTPATIENT
Start: 2025-03-02 | End: 2025-03-04

## 2025-03-02 RX ORDER — PRAMOXINE HCL 1 %
1 GEL (GRAM) TOPICAL EVERY 4 HOURS
Refills: 0 | Status: DISCONTINUED | OUTPATIENT
Start: 2025-03-02 | End: 2025-03-04

## 2025-03-02 RX ORDER — ACETAMINOPHEN 500 MG/5ML
975 LIQUID (ML) ORAL
Refills: 0 | Status: DISCONTINUED | OUTPATIENT
Start: 2025-03-02 | End: 2025-03-04

## 2025-03-02 RX ORDER — HYDROCORTISONE 10 MG/G
1 CREAM TOPICAL EVERY 6 HOURS
Refills: 0 | Status: DISCONTINUED | OUTPATIENT
Start: 2025-03-02 | End: 2025-03-04

## 2025-03-02 RX ORDER — KETOROLAC TROMETHAMINE 30 MG/ML
30 INJECTION, SOLUTION INTRAMUSCULAR; INTRAVENOUS ONCE
Refills: 0 | Status: DISCONTINUED | OUTPATIENT
Start: 2025-03-02 | End: 2025-03-02

## 2025-03-02 RX ORDER — DIBUCAINE 10 MG/G
1 OINTMENT TOPICAL EVERY 6 HOURS
Refills: 0 | Status: DISCONTINUED | OUTPATIENT
Start: 2025-03-02 | End: 2025-03-04

## 2025-03-02 RX ORDER — SIMETHICONE 80 MG
80 TABLET,CHEWABLE ORAL EVERY 4 HOURS
Refills: 0 | Status: DISCONTINUED | OUTPATIENT
Start: 2025-03-02 | End: 2025-03-04

## 2025-03-02 RX ORDER — MAGNESIUM HYDROXIDE 400 MG/5ML
30 SUSPENSION ORAL
Refills: 0 | Status: DISCONTINUED | OUTPATIENT
Start: 2025-03-02 | End: 2025-03-04

## 2025-03-02 RX ORDER — CLOSTRIDIUM TETANI TOXOID ANTIGEN (FORMALDEHYDE INACTIVATED), CORYNEBACTERIUM DIPHTHERIAE TOXOID ANTIGEN (FORMALDEHYDE INACTIVATED), BORDETELLA PERTUSSIS TOXOID ANTIGEN (GLUTARALDEHYDE INACTIVATED), BORDETELLA PERTUSSIS FILAMENTOUS HEMAGGLUTININ ANTIGEN (FORMALDEHYDE INACTIVATED), BORDETELLA PERTUSSIS PERTACTIN ANTIGEN, AND BORDETELLA PERTUSSIS FIMBRIAE 2/3 ANTIGEN 5; 2; 2.5; 5; 3; 5 [LF]/.5ML; [LF]/.5ML; UG/.5ML; UG/.5ML; UG/.5ML; UG/.5ML
0.5 INJECTION, SUSPENSION INTRAMUSCULAR ONCE
Refills: 0 | Status: DISCONTINUED | OUTPATIENT
Start: 2025-03-02 | End: 2025-03-04

## 2025-03-02 RX ORDER — IBUPROFEN 200 MG
600 TABLET ORAL EVERY 6 HOURS
Refills: 0 | Status: COMPLETED | OUTPATIENT
Start: 2025-03-02 | End: 2026-01-29

## 2025-03-02 RX ORDER — PRENATAL 136/IRON/FOLIC ACID 27 MG-1 MG
1 TABLET ORAL DAILY
Refills: 0 | Status: DISCONTINUED | OUTPATIENT
Start: 2025-03-02 | End: 2025-03-04

## 2025-03-02 RX ADMIN — Medication 600 MILLIGRAM(S): at 17:21

## 2025-03-02 RX ADMIN — Medication 975 MILLIGRAM(S): at 08:42

## 2025-03-02 RX ADMIN — Medication 3 MILLILITER(S): at 21:21

## 2025-03-02 RX ADMIN — Medication 1 TABLET(S): at 11:55

## 2025-03-02 RX ADMIN — Medication 600 MILLIGRAM(S): at 11:55

## 2025-03-02 RX ADMIN — Medication 3 MILLILITER(S): at 18:38

## 2025-03-02 RX ADMIN — KETOROLAC TROMETHAMINE 30 MILLIGRAM(S): 30 INJECTION, SOLUTION INTRAMUSCULAR; INTRAVENOUS at 05:19

## 2025-03-02 RX ADMIN — Medication 975 MILLIGRAM(S): at 20:11

## 2025-03-02 RX ADMIN — SODIUM CHLORIDE 125 MILLILITER(S): 9 INJECTION, SOLUTION INTRAVENOUS at 02:33

## 2025-03-02 NOTE — DISCHARGE NOTE OB - PATIENT PORTAL LINK FT
You can access the FollowMyHealth Patient Portal offered by [facility] by registering at the following website: [email or web address]. By joining [employer]’s FollowMyHealth portal, you will also be able to view your health information using other applications (apps) compatible with our system.

## 2025-03-02 NOTE — OB PROVIDER DELIVERY SUMMARY - NSSELHIDDEN_OBGYN_ALL_OB_FT
[NS_[email or web address]_ALL_OB_FT:[id number]=],[NS_DeliveryAssist1_OBGYN_ALL_OB_FT:[id number]=]

## 2025-03-02 NOTE — OB RN DELIVERY SUMMARY - NSSELHIDDEN_OBGYN_ALL_OB_FT
[NS_DeliveryAttending1_OBGYN_ALL_OB_FT:[id number]=],[NS_DeliveryAssist1_OBGYN_ALL_OB_FT:[email or web address]=],[NS_DeliveryRN_OBGYN_ALL_OB_FT:[id number]=]

## 2025-03-02 NOTE — OB PROVIDER LABOR PROGRESS NOTE - ASSESSMENT
pt presented in early labor  labor augmented w arom @1040 and pit @2043  unable to uptitrate pit 2/2 contractions frequency  given minimal cervical change since last exam, iupc placed to determine contractions strength  c/w almita, plan to reexamine in 4h
A/P:    Cat I tracing  AROM: Clear fluid  Patient currently apprehensive about Pitocin, to reconsider 
fht w prolonged decelerations, improved w repositioning, decreasing pit   cervix fully dilated, fetal head +1 station, will start pushing  continue to reposition/rescucitate fht 
A/P:    Cat II tracing  Patient placed in left lateral position  Patient consented to starting Pitocin in view of lack of cervical change  To start Pitocin once FHT is Cat I. To cont repositioning patient.  Re-assess accordingly
pelvic exam w improvement from last, fetal head better applied and cervix more effaced  will start amnioinfusion for variable decelerations  continuous uterine/fetal monitoring    discussed with dr. reyes

## 2025-03-02 NOTE — OB NEONATOLOGY/PEDIATRICIAN DELIVERY SUMMARY - NSPEDSNEONOTESA_OBGYN_ALL_OB_FT
Called to attend this  at 40 4/7 weeks for meconium. Mother is a 30yo  with no significant prenatal hx, HepBsAG pending, HIV neg, syphilis neg, rubella pending, GBS neg, HepC NR, Blood type O+. Per nursing final stage of labor progressed rapidly and I was called immediately after delivery. On arrival baby was on warmer with some flexion of extremities, low HR but no respiratory effort. She was quickly suctioned and PPV started 20/5 for < 30 second with improvement in HR to >100 and initiation of breathing. She had intermittent good respiratory effort, pulse ox placed, CPAP continued and oxygen adjusted to achieve normal saturations. CPAP discontinued and saturations remained normal with no signs of respiratory distress.  Apgars 2/8/9.

## 2025-03-02 NOTE — DISCHARGE NOTE OB - FINANCIAL ASSISTANCE
[facility] provides services at a reduced cost to those who are determined to be eligible through [facility]’s financial assistance program. Information regarding [facility]’s financial assistance program can be found by going to [email or web address] or by calling [phone].

## 2025-03-02 NOTE — OB PROVIDER DELIVERY SUMMARY - NSPROVIDERDELIVERYNOTE_OBGYN_ALL_OB_FT
pt pushed effectively, infant delivered through tight nuchal x1, limp, no cry. cord was clamped and cut immediately and baby was brought over to the warmer for resuscitation by alfie, apgars 2 and 8. placenta delivered spontaneously, intact, uterus firm. vagina and perineum were inspected, 2nd degree lac was repaired w 2-0 vicryl, hemostasis appreciated. count correct. ebl 250cc.

## 2025-03-02 NOTE — DISCHARGE NOTE OB - DISCHARGE DATE
Lm to see how pt is doing week 2 post inj  
Pt reports no improvement post inj   Pain level 7/10  Pt aware we will call next week for an update    bilateral Knee injection 10/13, No F/u   
04-Mar-2025

## 2025-03-02 NOTE — DISCHARGE NOTE OB - CARE PROVIDER_API CALL
Chan Graf  Obstetrics and Gynecology  [address], [address]  [place] [address]  Phone: [phone]  Fax: [phone]  Follow Up Time: 2 months

## 2025-03-02 NOTE — DISCHARGE NOTE OB - NS MD DC FALL RISK RISK
For information on Fall & Injury Prevention, visit: [email or web address] OR  [email or web address] OR  https://www.cdc.gov/steadi/patient.html

## 2025-03-02 NOTE — DISCHARGE NOTE OB - CARE PLAN
1 Principal Discharge DX:	 (normal spontaneous vaginal delivery)  Assessment and plan of treatment:	1) Please take ibuprofen and/or Tylenol as needed for pain as prescribed.  2) Nothing in the vagina for 6 weeks (including no sex, no tampons, and no douching).  3) Please call your doctor for a follow up your postpartum appointment in 4-6 weeks.  4) Please continue taking vitamins postpartum.   5) Please call the office sooner if you have heavy vaginal bleeding, severe abdominal pain, or fever > 100.4F.  6) You may resume regular daily activity as tolerated

## 2025-03-02 NOTE — DISCHARGE NOTE OB - MEDICATION SUMMARY - MEDICATIONS TO TAKE
I will START or STAY ON the medications listed below when I get home from the hospital:    ibuprofen 600 mg oral tablet  -- 1 tab(s) by mouth every 6 hours as needed for  moderate pain  -- Indication: For pain    acetaminophen 325 mg oral tablet  -- 3 tab(s) by mouth every 6 hours as needed for  moderate pain  -- Indication: For pain    Prenatal 1 Plus 1 oral tablet  -- 1 tab(s) by mouth once a day  -- Indication: For Home med

## 2025-03-02 NOTE — OB PROVIDER LABOR PROGRESS NOTE - NS_OBIHIFHRDETAILS_OBGYN_ALL_OB_FT
baseline 140bpm, moderate variability, +accels, x1 variable decel
baseline 145bpm, moderate variability, + accelerations, +variable decelerations
baseline 145, moderate variability, - accelerations, + variable decelerations
baseline 135, moderate variability, +accels, -decels
baseline 150, moderate variability, - accelerations, late decelerations

## 2025-03-02 NOTE — OB PROVIDER LABOR PROGRESS NOTE - NS_SUBJECTIVE/OBJECTIVE_OBGYN_ALL_OB_FT
re-assessment of patient, for AROM
pt c/o feeling increased pelvic pressure    Vital Signs Last 24 Hrs  T(C): 37.2 (02 Mar 2025 00:10), Max: 37.2 (01 Mar 2025 19:13)  T(F): 98.96 (02 Mar 2025 00:10), Max: 98.96 (01 Mar 2025 19:13)  HR: 95 (02 Mar 2025 01:07) (74 - 105)  BP: 117/58 (02 Mar 2025 01:07) (95/50 - 148/78)  RR: 18 (02 Mar 2025 00:10) (16 - 18)  SpO2: 100% (01 Mar 2025 07:59) (92% - 100%)    Parameters below as of 01 Mar 2025 05:52  Patient On (Oxygen Delivery Method): room air
pt c/o increased rectal pressure    Vital Signs Last 24 Hrs  T(C): 36.9 (02 Mar 2025 03:50), Max: 37.2 (01 Mar 2025 19:13)  T(F): 98.42 (02 Mar 2025 03:50), Max: 98.96 (01 Mar 2025 19:13)  HR: 75 (02 Mar 2025 03:23) (74 - 105)  BP: 108/70 (02 Mar 2025 03:23) (95/50 - 148/78)  RR: 18 (02 Mar 2025 00:10) (16 - 18)  SpO2: 100% (01 Mar 2025 07:59) (92% - 100%)    Parameters below as of 01 Mar 2025 05:52  Patient On (Oxygen Delivery Method): room air
pt comfortable w epidural    Vital Signs Last 24 Hrs  T(C): 37.1 (01 Mar 2025 22:04), Max: 37.2 (01 Mar 2025 19:13)  T(F): 98.78 (01 Mar 2025 22:04), Max: 98.96 (01 Mar 2025 19:13)  HR: 94 (01 Mar 2025 22:38) (74 - 105)  BP: 119/83 (01 Mar 2025 22:38) (95/50 - 148/78)  RR: 16 (01 Mar 2025 05:52) (16 - 16)  SpO2: 100% (01 Mar 2025 07:59) (92% - 100%)    Parameters below as of 01 Mar 2025 05:52  Patient On (Oxygen Delivery Method): room air
Re-assessment of patient

## 2025-03-03 DIAGNOSIS — D50.9 IRON DEFICIENCY ANEMIA, UNSPECIFIED: ICD-10-CM

## 2025-03-03 DIAGNOSIS — O34.83 MATERNAL CARE FOR OTHER ABNORMALITIES OF PELVIC ORGANS, THIRD TRIMESTER: ICD-10-CM

## 2025-03-03 DIAGNOSIS — O36.8330 MATERNAL CARE FOR ABNORMALITIES OF THE FETAL HEART RATE OR RHYTHM, THIRD TRIMESTER, NOT APPLICABLE OR UNSPECIFIED: ICD-10-CM

## 2025-03-03 DIAGNOSIS — O99.013 ANEMIA COMPLICATING PREGNANCY, THIRD TRIMESTER: ICD-10-CM

## 2025-03-03 DIAGNOSIS — O47.1 FALSE LABOR AT OR AFTER 37 COMPLETED WEEKS OF GESTATION: ICD-10-CM

## 2025-03-03 DIAGNOSIS — D57.3 SICKLE-CELL TRAIT: ICD-10-CM

## 2025-03-03 DIAGNOSIS — N83.209 UNSPECIFIED OVARIAN CYST, UNSPECIFIED SIDE: ICD-10-CM

## 2025-03-03 DIAGNOSIS — Z3A.40 40 WEEKS GESTATION OF PREGNANCY: ICD-10-CM

## 2025-03-03 LAB
HCT VFR BLD CALC: 31.9 % — LOW (ref 34.5–45)
HGB BLD-MCNC: 10.8 G/DL — LOW (ref 11.5–15.5)

## 2025-03-03 RX ADMIN — Medication 3 MILLILITER(S): at 05:27

## 2025-03-03 RX ADMIN — Medication 600 MILLIGRAM(S): at 18:15

## 2025-03-03 RX ADMIN — Medication 600 MILLIGRAM(S): at 12:00

## 2025-03-03 RX ADMIN — Medication 600 MILLIGRAM(S): at 06:55

## 2025-03-03 NOTE — PROGRESS NOTE ADULT - ATTENDING COMMENTS
Pt is doing well after  on PPD 1.  She has no complaints today   Will continue routine PP care  Discharge planning for PPD 2    LAKESHIA Lyles

## 2025-03-04 ENCOUNTER — NON-APPOINTMENT (OUTPATIENT)
Age: 30
End: 2025-03-04

## 2025-03-04 VITALS
TEMPERATURE: 98 F | DIASTOLIC BLOOD PRESSURE: 67 MMHG | RESPIRATION RATE: 17 BRPM | OXYGEN SATURATION: 99 % | SYSTOLIC BLOOD PRESSURE: 111 MMHG | HEART RATE: 76 BPM

## 2025-03-04 PROCEDURE — 86765 RUBEOLA ANTIBODY: CPT

## 2025-03-04 PROCEDURE — 85025 COMPLETE CBC W/AUTO DIFF WBC: CPT

## 2025-03-04 PROCEDURE — 85018 HEMOGLOBIN: CPT

## 2025-03-04 PROCEDURE — 86762 RUBELLA ANTIBODY: CPT

## 2025-03-04 PROCEDURE — 87340 HEPATITIS B SURFACE AG IA: CPT

## 2025-03-04 PROCEDURE — 76815 OB US LIMITED FETUS(S): CPT

## 2025-03-04 PROCEDURE — 86900 BLOOD TYPING SEROLOGIC ABO: CPT

## 2025-03-04 PROCEDURE — 86901 BLOOD TYPING SEROLOGIC RH(D): CPT

## 2025-03-04 PROCEDURE — 82570 ASSAY OF URINE CREATININE: CPT

## 2025-03-04 PROCEDURE — 85014 HEMATOCRIT: CPT

## 2025-03-04 PROCEDURE — 84156 ASSAY OF PROTEIN URINE: CPT

## 2025-03-04 PROCEDURE — 86780 TREPONEMA PALLIDUM: CPT

## 2025-03-04 PROCEDURE — 86850 RBC ANTIBODY SCREEN: CPT

## 2025-03-04 PROCEDURE — 36415 COLL VENOUS BLD VENIPUNCTURE: CPT

## 2025-03-04 PROCEDURE — 59050 FETAL MONITOR W/REPORT: CPT

## 2025-03-04 PROCEDURE — 80053 COMPREHEN METABOLIC PANEL: CPT

## 2025-03-04 RX ORDER — ACETAMINOPHEN 500 MG/5ML
3 LIQUID (ML) ORAL
Qty: 60 | Refills: 0
Start: 2025-03-04 | End: 2025-03-08

## 2025-03-04 RX ORDER — IBUPROFEN 200 MG
1 TABLET ORAL
Qty: 20 | Refills: 0
Start: 2025-03-04 | End: 2025-03-08

## 2025-03-04 RX ADMIN — Medication 600 MILLIGRAM(S): at 13:09

## 2025-03-04 RX ADMIN — Medication 600 MILLIGRAM(S): at 00:27

## 2025-03-04 NOTE — PROGRESS NOTE ADULT - ASSESSMENT
Assessment/Plan:   29y  now PPD# 1 s/p  at 40w3d EGA, APGARS 2/9, otherwise uncomplicated course.     #Routine post partum care  - Stable, doing well postpartum  - Hgb 13.2 > 10.8   - Pain: well controlled on PO pain meds  - GI: regular diet, normal bowel function  - : voiding, lochia decreasing  - DVT ppx: SCDs, ambulation encouraged  - Healthy baby F    Dispo: Patient to be discharged when meeting all postpartum milestones and pending attending approval.    Signed: Caroline Campbell MD (PGY1)
Assessment/Plan:   29y  now PPD# 2 s/p  at 40w3d EGA, APGARS 2/9, otherwise uncomplicated course.     #Routine post partum care  - Stable, doing well postpartum  - Hgb 13.2 > 10.8   - Pain: well controlled on PO pain meds  - GI: regular diet, normal bowel function  - : voiding, lochia decreasing  - DVT ppx: SCDs, ambulation encouraged  - Healthy baby F    Dispo: Possible discharge home today, pending attending approval     Signed: Caroline Campblel MD (PGY1)
INTERVAL HPI/OVERNIGHT EVENTS:  29y Female s/p labor epidural on 3/1/25    Vital Signs Last 24 Hrs  T(C): 36.6 (02 Mar 2025 15:51), Max: 37.2 (02 Mar 2025 00:10)  T(F): 97.9 (02 Mar 2025 15:51), Max: 98.96 (02 Mar 2025 00:10)  HR: 80 (02 Mar 2025 15:51) (75 - 110)  BP: 104/67 (02 Mar 2025 15:51) (97/56 - 163/78)  BP(mean): --  RR: 18 (02 Mar 2025 15:51) (17 - 20)  SpO2: 96% (02 Mar 2025 15:51) (96% - 98%)    Parameters below as of 02 Mar 2025 15:51  Patient On (Oxygen Delivery Method): room air            Patient's overall anesthesia satisfaction: Positive    Patient doing well     No headache      No residual numbness or weakness, sensory and motor function intact    No anesthetic complications or complaints noted or reported

## 2025-03-04 NOTE — PROGRESS NOTE ADULT - SUBJECTIVE AND OBJECTIVE BOX
JERMAINE RODRIGUEZ is a 29y  now PPD# 2 s/p  at 40w3d EGA, APGARS 2/9, otherwise uncomplicated course.     Subjective:    No acute events overnight.  Patient has no complaints.  Pain is well controlled.  +flatus, + voiding.  Ambulating and tolerating PO.  Appropriate lochia.  Denies fever, chills, nausea, and vomiting.  She denies lightheadedness, dizziness, HA, blurry vision, palpitations, chest pain and SOB.     Objective:    Vital Signs Last 24 Hrs  T(C): 36.9 (04 Mar 2025 04:00), Max: 36.9 (04 Mar 2025 04:00)  T(F): 98.5 (04 Mar 2025 04:00), Max: 98.5 (04 Mar 2025 04:00)  HR: 76 (04 Mar 2025 04:00) (76 - 80)  BP: 111/67 (04 Mar 2025 04:00) (107/68 - 111/67)  BP(mean): --  RR: 17 (04 Mar 2025 04:00) (17 - 17)  SpO2: 99% (04 Mar 2025 04:00) (98% - 99%)    Parameters below as of 04 Mar 2025 04:00  Patient On (Oxygen Delivery Method): room air      Physical exam:  General: AOx3, NAD.  Abdomen: Soft, appropriately tender to palpitation, fundus firm.  Vaginal: expectant lochia  Ext: No DVT signs, warm extremities.                          10.8   x     )-----------( x        ( 03 Mar 2025 04:51 )             31.9         135  |  99  |  7.6[L]  ----------------------------<  58[L]  3.9   |  19.0[L]  |  0.48[L]    Ca    9.3      01 Mar 2025 10:00    TPro  7.3  /  Alb  3.8  /  TBili  0.3[L]  /  DBili  x   /  AST  24  /  ALT  16  /  AlkPhos  116      
JERMAINE RODRIGUEZ is a 29y  now PPD# 1 s/p  at 40w3d EGA, APGARS 2/9, otherwise uncomplicated course.     Subjective:    No acute events overnight.  Patient has no complaints.  Pain is well controlled.  +flatus, + voiding.  Ambulating and tolerating PO.  Appropriate lochia.  Denies fever, chills, nausea, and vomiting.  She denies lightheadedness, dizziness, HA, blurry vision, palpitations, chest pain and SOB.     Objective:    T(C): 36.6 (25 @ 04:20), Max: 36.8 (25 @ 08:00)  HR: 82 (25 @ 04:20) (80 - 86)  BP: 90/57 (25 @ 04:20) (90/57 - 105/62)  RR: 17 (25 @ 04:20) (17 - 18)  SpO2: 98% (25 @ 04:20) (96% - 98%)    Physical exam:  General: AOx3, NAD.  Abdomen: Soft, appropriately tender to palpitation, fundus firm.  Vaginal: expectant lochia  Ext: No DVT signs, warm extremities.                          10.8   x     )-----------( x        ( 03 Mar 2025 04:51 )             31.9         135  |  99  |  7.6[L]  ----------------------------<  58[L]  3.9   |  19.0[L]  |  0.48[L]    Ca    9.3      01 Mar 2025 10:00    TPro  7.3  /  Alb  3.8  /  TBili  0.3[L]  /  DBili  x   /  AST  24  /  ALT  16  /  AlkPhos  116

## 2025-03-31 ENCOUNTER — APPOINTMENT (OUTPATIENT)
Dept: OBGYN | Facility: CLINIC | Age: 30
End: 2025-03-31
Payer: COMMERCIAL

## 2025-03-31 ENCOUNTER — LABORATORY RESULT (OUTPATIENT)
Age: 30
End: 2025-03-31

## 2025-03-31 VITALS
HEIGHT: 64 IN | SYSTOLIC BLOOD PRESSURE: 103 MMHG | DIASTOLIC BLOOD PRESSURE: 63 MMHG | WEIGHT: 120 LBS | BODY MASS INDEX: 20.49 KG/M2

## 2025-03-31 DIAGNOSIS — N89.8 OTHER SPECIFIED NONINFLAMMATORY DISORDERS OF VAGINA: ICD-10-CM

## 2025-03-31 DIAGNOSIS — Z00.00 ENCOUNTER FOR GENERAL ADULT MEDICAL EXAMINATION W/OUT ABNORMAL FINDINGS: ICD-10-CM

## 2025-03-31 PROCEDURE — 99395 PREV VISIT EST AGE 18-39: CPT

## 2025-03-31 PROCEDURE — 99214 OFFICE O/P EST MOD 30 MIN: CPT | Mod: 25

## 2025-03-31 RX ORDER — METRONIDAZOLE 7.5 MG/G
0.75 GEL VAGINAL
Qty: 1 | Refills: 0 | Status: ACTIVE | COMMUNITY
Start: 2025-03-31 | End: 1900-01-01

## 2025-04-07 LAB
A VAGINAE DNA VAG QL NAA+PROBE: NORMAL
BACTERIA GENITAL AEROBE CULT: NORMAL
BVAB2 DNA VAG QL NAA+PROBE: NORMAL
C KRUSEI DNA VAG QL NAA+PROBE: NEGATIVE
C TRACH RRNA SPEC QL NAA+PROBE: NEGATIVE
CANDIDA DNA VAG QL NAA+PROBE: NEGATIVE
CYTOLOGY CVX/VAG DOC THIN PREP: ABNORMAL
MEGA1 DNA VAG QL NAA+PROBE: NORMAL
N GONORRHOEA RRNA SPEC QL NAA+PROBE: NEGATIVE
T VAGINALIS RRNA SPEC QL NAA+PROBE: NEGATIVE

## 2025-04-23 NOTE — OB RN TRIAGE NOTE - NS_PRENATALSTART_OBGYN_ALL_OB
Called [facility] to check the status of Freestyle Rachna 3 sensors. They stated that she has a deductible with Medicare and her supplement insurance did not cover the deductible, so she had a copay. When they reached out to her about the copay, she requested that the order be cancelled and she will just use her test strips.   
Started first trimester

## 2025-05-07 ENCOUNTER — APPOINTMENT (OUTPATIENT)
Dept: OBGYN | Facility: CLINIC | Age: 30
End: 2025-05-07
Payer: COMMERCIAL

## 2025-05-07 VITALS
HEIGHT: 64 IN | SYSTOLIC BLOOD PRESSURE: 105 MMHG | WEIGHT: 126 LBS | BODY MASS INDEX: 21.51 KG/M2 | DIASTOLIC BLOOD PRESSURE: 64 MMHG

## 2025-05-07 DIAGNOSIS — N89.8 OTHER SPECIFIED NONINFLAMMATORY DISORDERS OF VAGINA: ICD-10-CM

## 2025-05-07 PROCEDURE — 99213 OFFICE O/P EST LOW 20 MIN: CPT

## 2025-05-07 RX ORDER — MISOPROSTOL 200 UG/1
200 TABLET ORAL
Qty: 2 | Refills: 0 | Status: ACTIVE | COMMUNITY
Start: 2025-05-07 | End: 1900-01-01

## 2025-07-21 ENCOUNTER — APPOINTMENT (OUTPATIENT)
Age: 30
End: 2025-07-21

## 2025-08-11 ENCOUNTER — APPOINTMENT (OUTPATIENT)
Dept: OBGYN | Facility: CLINIC | Age: 30
End: 2025-08-11